# Patient Record
Sex: MALE | Race: ASIAN | NOT HISPANIC OR LATINO | URBAN - METROPOLITAN AREA
[De-identification: names, ages, dates, MRNs, and addresses within clinical notes are randomized per-mention and may not be internally consistent; named-entity substitution may affect disease eponyms.]

---

## 2017-01-05 ENCOUNTER — EMERGENCY (EMERGENCY)
Facility: HOSPITAL | Age: 57
LOS: 1 days | Discharge: HOME | End: 2017-01-05
Attending: EMERGENCY MEDICINE | Admitting: EMERGENCY MEDICINE
Payer: MEDICAID

## 2017-01-05 VITALS
SYSTOLIC BLOOD PRESSURE: 146 MMHG | OXYGEN SATURATION: 100 % | HEART RATE: 102 BPM | RESPIRATION RATE: 19 BRPM | DIASTOLIC BLOOD PRESSURE: 82 MMHG | TEMPERATURE: 98 F

## 2017-01-05 DIAGNOSIS — J34.2 DEVIATED NASAL SEPTUM: Chronic | ICD-10-CM

## 2017-01-05 PROCEDURE — 99281 EMR DPT VST MAYX REQ PHY/QHP: CPT

## 2017-01-05 RX ORDER — DULOXETINE HYDROCHLORIDE 30 MG/1
2 CAPSULE, DELAYED RELEASE ORAL
Qty: 14 | Refills: 0 | OUTPATIENT
Start: 2017-01-05 | End: 2017-01-12

## 2017-01-05 RX ORDER — DULOXETINE HYDROCHLORIDE 30 MG/1
1 CAPSULE, DELAYED RELEASE ORAL
Qty: 14 | Refills: 0 | COMMUNITY
Start: 2017-01-05 | End: 2017-01-12

## 2017-01-05 RX ORDER — ZOLPIDEM TARTRATE 10 MG/1
1 TABLET ORAL
Qty: 7 | Refills: 0 | OUTPATIENT
Start: 2017-01-05

## 2017-01-05 NOTE — ED PROVIDER NOTE - MEDICAL DECISION MAKING DETAILS
Pt here for med refill. Will refill Simvalta and Rx ambient for x1wk duration. Return for any SI or other Sx. Pt here for med refill. Will refill Cymbalta and Rx ambient for x1wk duration. Return for any SI or other Sx. Pt here for med refill. Will refill Cymbalta and Rx ambient for x1wk duration  until psych appt. Return for any SI or other Sx. no need for ED w/u.

## 2017-01-05 NOTE — ED PROVIDER NOTE - CONTEXT
known (describe)/PMHx insomnia/Depression, ran out of Mina and Lavinia known (describe)/PMHx insomnia/Depression, ran out of Cymbalta and Imovan

## 2017-01-05 NOTE — ED PROVIDER NOTE - OBJECTIVE STATEMENT
55yo M with PMHx of depression (on Simvalta 120mg), Insomnia (on Imovan 22.5mg x3 qhs), presents to ED c/o insomnia, "cannot stop thinking", requesting medication refill s/p running out of Simvalt and Imovan today. Pt also c/o waking up in sweat "all the time". Pt states he has been taking Simvalta and Imovan for x10yrs, Imovan has been ineffective for x2yrs but used to work with only x1 tablet. Imovan last taken yesterday night. He has tried taking Stilnox for insomnia without relief. He was seen at an ED in [place], [place] x9dago for SI, Rx'd Fluphenazine 5mg BID, Benztropine 2mg qd, Trazodone 243nyq3 qhs. Pt is at ED because he could not get a psych appointment until x1wk from today. Pt state [place] the [place] from [place]d ago, he is currently financially bankrupt. Denies current SI, any other complaints. 57yo M with PMHx of depression (on Cymbalta 120mg), Insomnia (on Imovane 22.5mg x3 qhs), presents to ED c/o insomnia, "cannot stop thinking", requesting medication refill s/p running out of Cymbalta and Imovanetoday. Pt also c/o waking up in sweat "all the time". Pt states he has been taking Cymbalta and Imovane for x10yrs, Imovane has been ineffective for x2yrs but used to work with only x1 tablet. Imovane last taken yesterday night. He has tried taking Stilnox for insomnia without relief. He was seen at an ED in [place], [place] x9dago for SI, Rx'd Fluphenazine 5mg BID, Benztropine 2mg qd, Trazodone [id number] qhs. Pt is at ED because he could not get a psych appointment until x1wk from today. Pt state [place] from [place]d ago, he is currently financially bankrupt. Denies current SI, any other complaints. 57yo M with PMHx of depression (on Cymbalta 120mg hs), Insomnia (on Imovane 22.5mg x3 qhs), presents to ED c/o insomnia, "cannot stop thinking", requesting medication refill s/p running out of Cymbalta and Imovane today. Pt states he has been taking Cymbalta and Imovane for x10yrs, Imovane has been ineffective for x2yrs but used to work with only x1 tablet. Imovane last taken yesterday night. He has tried taking Stilnox for insomnia without relief. He was seen at an ED in [place], [place] x9dago for SI, Rx'd Fluphenazine 5mg BID, Benztropine 2mg qd, Trazodone 134zyi0 qhs. Pt is at ED because he could not get a psych appointment until x1wk from today. Pt state [place] from [place] ago, he is currently financially bankrupt. Denies current SI, any other complaints. says psych meds from [facility] ED did not work and only gave him a dry mouth.

## 2017-01-05 NOTE — ED ADULT TRIAGE NOTE - CHIEF COMPLAINT QUOTE
Pt states he is from [facility] and visiting family in [place]. Hx of insomnia and depression. Pt does not have any meds with him and would like a prescription for them. On cymbalta 120mg and requesting a new med for insomnia. Psych attending called, requesting for patient to be seen by otf [facility].

## 2017-01-05 NOTE — ED PROVIDER NOTE - NS ED MD SCRIBE ATTENDING SCRIBE SECTIONS
HIV/REVIEW OF SYSTEMS/PHYSICAL EXAM/HISTORY OF PRESENT ILLNESS/VITAL SIGNS( Pullset)/DISPOSITION/PAST MEDICAL/SURGICAL/SOCIAL HISTORY

## 2017-01-17 PROBLEM — Z00.00 ENCOUNTER FOR PREVENTIVE HEALTH EXAMINATION: Status: ACTIVE | Noted: 2017-01-17

## 2017-02-01 PROBLEM — F32.9 MAJOR DEPRESSIVE DISORDER, SINGLE EPISODE, UNSPECIFIED: Chronic | Status: ACTIVE | Noted: 2017-01-05

## 2017-02-01 PROBLEM — G47.00 INSOMNIA, UNSPECIFIED: Chronic | Status: ACTIVE | Noted: 2017-01-05

## 2017-02-03 ENCOUNTER — APPOINTMENT (OUTPATIENT)
Dept: UROLOGY | Facility: CLINIC | Age: 57
End: 2017-02-03

## 2017-02-24 ENCOUNTER — APPOINTMENT (OUTPATIENT)
Dept: UROLOGY | Facility: CLINIC | Age: 57
End: 2017-02-24

## 2017-02-24 VITALS
HEIGHT: 71 IN | SYSTOLIC BLOOD PRESSURE: 111 MMHG | HEART RATE: 98 BPM | WEIGHT: 185 LBS | TEMPERATURE: 98.3 F | DIASTOLIC BLOOD PRESSURE: 68 MMHG | BODY MASS INDEX: 25.9 KG/M2

## 2017-02-24 DIAGNOSIS — F32.9 MAJOR DEPRESSIVE DISORDER, SINGLE EPISODE, UNSPECIFIED: ICD-10-CM

## 2017-02-24 DIAGNOSIS — Z83.3 FAMILY HISTORY OF DIABETES MELLITUS: ICD-10-CM

## 2017-02-24 RX ORDER — DULOXETINE HYDROCHLORIDE 30 MG/1
CAPSULE, DELAYED RELEASE ORAL
Refills: 0 | Status: ACTIVE | COMMUNITY

## 2017-02-27 ENCOUNTER — APPOINTMENT (OUTPATIENT)
Age: 57
End: 2017-02-27

## 2017-02-27 LAB
BILIRUB UR QL STRIP: NORMAL
CLARITY UR: CLEAR
COLLECTION METHOD: NORMAL
GLUCOSE UR-MCNC: NORMAL
HCG UR QL: 32 EU/DL
HGB UR QL STRIP.AUTO: NORMAL
KETONES UR-MCNC: NORMAL
LEUKOCYTE ESTERASE UR QL STRIP: NORMAL
NITRITE UR QL STRIP: NORMAL
PH UR STRIP: 6
PROT UR STRIP-MCNC: 100
SP GR UR STRIP: 1.02

## 2017-03-17 ENCOUNTER — APPOINTMENT (OUTPATIENT)
Dept: UROLOGY | Facility: CLINIC | Age: 57
End: 2017-03-17

## 2017-03-17 VITALS — HEART RATE: 111 BPM

## 2017-03-17 VITALS
WEIGHT: 185 LBS | DIASTOLIC BLOOD PRESSURE: 73 MMHG | BODY MASS INDEX: 25.9 KG/M2 | TEMPERATURE: 97.6 F | HEART RATE: 118 BPM | RESPIRATION RATE: 17 BRPM | SYSTOLIC BLOOD PRESSURE: 169 MMHG | HEIGHT: 71 IN

## 2017-03-17 LAB
PSA FREE FLD-MCNC: 31.7 %
PSA FREE SERPL-MCNC: 0.06 NG/ML
PSA SERPL-MCNC: 0.19 NG/ML

## 2017-03-17 RX ORDER — MIRABEGRON 25 MG/1
25 TABLET, FILM COATED, EXTENDED RELEASE ORAL
Qty: 30 | Refills: 0 | Status: ACTIVE | COMMUNITY
Start: 2017-03-17 | End: 1900-01-01

## 2017-04-05 ENCOUNTER — APPOINTMENT (OUTPATIENT)
Dept: CT IMAGING | Facility: IMAGING CENTER | Age: 57
End: 2017-04-05

## 2017-04-05 ENCOUNTER — INPATIENT (INPATIENT)
Facility: HOSPITAL | Age: 57
LOS: 6 days | Discharge: HOME | End: 2017-04-12
Attending: HOSPITALIST | Admitting: HOSPITALIST
Payer: MEDICAID

## 2017-04-05 ENCOUNTER — OUTPATIENT (OUTPATIENT)
Dept: OUTPATIENT SERVICES | Facility: HOSPITAL | Age: 57
LOS: 1 days | End: 2017-04-05
Payer: MEDICAID

## 2017-04-05 VITALS
RESPIRATION RATE: 18 BRPM | SYSTOLIC BLOOD PRESSURE: 124 MMHG | TEMPERATURE: 97 F | OXYGEN SATURATION: 97 % | HEART RATE: 93 BPM | DIASTOLIC BLOOD PRESSURE: 74 MMHG

## 2017-04-05 DIAGNOSIS — R59.0 LOCALIZED ENLARGED LYMPH NODES: ICD-10-CM

## 2017-04-05 DIAGNOSIS — J34.2 DEVIATED NASAL SEPTUM: Chronic | ICD-10-CM

## 2017-04-05 LAB
ALBUMIN SERPL ELPH-MCNC: 3.4 G/DL — SIGNIFICANT CHANGE UP (ref 3.3–5)
ALP SERPL-CCNC: 157 U/L — HIGH (ref 40–120)
ALT FLD-CCNC: 19 U/L — SIGNIFICANT CHANGE UP (ref 4–41)
ANISOCYTOSIS BLD QL: SLIGHT — SIGNIFICANT CHANGE UP
APTT BLD: 43.9 SEC — HIGH (ref 27.5–37.4)
AST SERPL-CCNC: 22 U/L — SIGNIFICANT CHANGE UP (ref 4–40)
BASOPHILS # BLD AUTO: 0.04 K/UL — SIGNIFICANT CHANGE UP (ref 0–0.2)
BASOPHILS NFR BLD AUTO: 0.7 % — SIGNIFICANT CHANGE UP (ref 0–2)
BILIRUB SERPL-MCNC: 0.6 MG/DL — SIGNIFICANT CHANGE UP (ref 0.2–1.2)
BUN SERPL-MCNC: 15 MG/DL — SIGNIFICANT CHANGE UP (ref 7–23)
CALCIUM SERPL-MCNC: 9.4 MG/DL — SIGNIFICANT CHANGE UP (ref 8.4–10.5)
CHLORIDE SERPL-SCNC: 96 MMOL/L — LOW (ref 98–107)
CO2 SERPL-SCNC: 22 MMOL/L — SIGNIFICANT CHANGE UP (ref 22–31)
CREAT SERPL-MCNC: 0.99 MG/DL — SIGNIFICANT CHANGE UP (ref 0.5–1.3)
EOSINOPHIL # BLD AUTO: 0.09 K/UL — SIGNIFICANT CHANGE UP (ref 0–0.5)
EOSINOPHIL NFR BLD AUTO: 1.5 % — SIGNIFICANT CHANGE UP (ref 0–6)
GLUCOSE SERPL-MCNC: 96 MG/DL — SIGNIFICANT CHANGE UP (ref 70–99)
HCT VFR BLD CALC: 31.2 % — LOW (ref 39–50)
HGB BLD-MCNC: 9.7 G/DL — LOW (ref 13–17)
IMM GRANULOCYTES NFR BLD AUTO: 0.3 % — SIGNIFICANT CHANGE UP (ref 0–1.5)
INR BLD: 1.28 — HIGH (ref 0.88–1.17)
LYMPHOCYTES # BLD AUTO: 1.23 K/UL — SIGNIFICANT CHANGE UP (ref 1–3.3)
LYMPHOCYTES # BLD AUTO: 21.1 % — SIGNIFICANT CHANGE UP (ref 13–44)
MANUAL SMEAR VERIFICATION: SIGNIFICANT CHANGE UP
MCHC RBC-ENTMCNC: 21.3 PG — LOW (ref 27–34)
MCHC RBC-ENTMCNC: 31.1 % — LOW (ref 32–36)
MCV RBC AUTO: 68.4 FL — LOW (ref 80–100)
MICROCYTES BLD QL: SLIGHT — SIGNIFICANT CHANGE UP
MONOCYTES # BLD AUTO: 0.74 K/UL — SIGNIFICANT CHANGE UP (ref 0–0.9)
MONOCYTES NFR BLD AUTO: 12.7 % — SIGNIFICANT CHANGE UP (ref 2–14)
NEUTROPHILS # BLD AUTO: 3.71 K/UL — SIGNIFICANT CHANGE UP (ref 1.8–7.4)
NEUTROPHILS NFR BLD AUTO: 63.7 % — SIGNIFICANT CHANGE UP (ref 43–77)
OB PNL STL: POSITIVE — SIGNIFICANT CHANGE UP
PLATELET # BLD AUTO: 439 K/UL — HIGH (ref 150–400)
PLATELET COUNT - ESTIMATE: NORMAL — SIGNIFICANT CHANGE UP
PMV BLD: 9.3 FL — SIGNIFICANT CHANGE UP (ref 7–13)
POTASSIUM SERPL-MCNC: 4.5 MMOL/L — SIGNIFICANT CHANGE UP (ref 3.5–5.3)
POTASSIUM SERPL-SCNC: 4.5 MMOL/L — SIGNIFICANT CHANGE UP (ref 3.5–5.3)
PROT SERPL-MCNC: 10.3 G/DL — HIGH (ref 6–8.3)
PROTHROM AB SERPL-ACNC: 14.4 SEC — HIGH (ref 9.8–13.1)
RBC # BLD: 4.56 M/UL — SIGNIFICANT CHANGE UP (ref 4.2–5.8)
RBC # FLD: 19 % — HIGH (ref 10.3–14.5)
SODIUM SERPL-SCNC: 133 MMOL/L — LOW (ref 135–145)
TARGETS BLD QL SMEAR: SLIGHT — SIGNIFICANT CHANGE UP
TSH SERPL-MCNC: 2.82 UIU/ML — SIGNIFICANT CHANGE UP (ref 0.27–4.2)
WBC # BLD: 5.83 K/UL — SIGNIFICANT CHANGE UP (ref 3.8–10.5)
WBC # FLD AUTO: 5.83 K/UL — SIGNIFICANT CHANGE UP (ref 3.8–10.5)

## 2017-04-05 PROCEDURE — 70553 MRI BRAIN STEM W/O & W/DYE: CPT | Mod: 26

## 2017-04-05 PROCEDURE — 70491 CT SOFT TISSUE NECK W/DYE: CPT | Mod: 26

## 2017-04-05 PROCEDURE — 70450 CT HEAD/BRAIN W/O DYE: CPT | Mod: 26

## 2017-04-05 RX ORDER — KETOROLAC TROMETHAMINE 30 MG/ML
15 SYRINGE (ML) INJECTION ONCE
Qty: 0 | Refills: 0 | Status: DISCONTINUED | OUTPATIENT
Start: 2017-04-05 | End: 2017-04-05

## 2017-04-05 RX ADMIN — Medication 15 MILLIGRAM(S): at 23:45

## 2017-04-05 RX ADMIN — Medication 15 MILLIGRAM(S): at 23:27

## 2017-04-05 NOTE — ED ADULT NURSE NOTE - ED STAT RN HANDOFF DETAILS
Patient is in NAD, and has room available.  Report given to nurse on floor via phone.  Patient awaiting transportation.  Will continue to monitor patient closely. IRASEMA Hyman R.N.

## 2017-04-05 NOTE — ED PROVIDER NOTE - ATTENDING CONTRIBUTION TO CARE
Canas: 57 yom with sent to ED for abnormal brain finding on Ct neck.  Pt has hx of depression and fatigue and was getting workup and found to have abnormal thyroid and lymph nodes of neck, has set up for biopsy next week.  Pt states he has frontal headache, no change in vision, no trauma, no issues with balance.  On exam, well appearing flat affect, no SI or HI, clear lungs, right neck LAD, clear lungs, normal cardiac, abd soft, no edema, no calf tn.  CT head non con ordered, unable to give IV today.

## 2017-04-05 NOTE — ED ADULT NURSE NOTE - OBJECTIVE STATEMENT
pt to trauma B, alert and oriented X 3 from [address] [place] [address] after having CT of neck for lumbar radiculopathy and CT result revealed a possible mass on the brain or bleed.  Pt denies any pain yet c/o dizziness and feeling tired, labs drawn and sent, vitals as noted, will monitor and follow up with additional orders as necessary

## 2017-04-05 NOTE — ED ADULT NURSE NOTE - CHIEF COMPLAINT QUOTE
Pt sent from [address] [place] [address] after having CT of neck for lumbar radiculopathy and CT result revealed a possible mass on the brain or bleed.  Pt denies any pain yet c/o dizziness and feeling tired  22g to right wrist placed by radiology dept

## 2017-04-05 NOTE — ED ADULT TRIAGE NOTE - CHIEF COMPLAINT QUOTE
Pt sent from [address] [place] [address] after having CT of neck for lumbar radiculopathy and CT result revealed a possible mass on the brain or bleed.  Pt denies any pain or discomfort at this time   22g to right wrist placed by radiology dept Pt sent from [address] [place] [address] after having CT of neck for lumbar radiculopathy and CT result revealed a possible mass on the brain or bleed.  Pt denies any pain yet c/o dizziness and feeling tired  22g to right wrist placed by radiology dept

## 2017-04-05 NOTE — ED PROVIDER NOTE - PROGRESS NOTE DETAILS
Pt has been reassessed at regular intervals during ED stay. Pt A&Ox3, normal motor/sensation. Awaiting MRI read. Spoke with radiology, who say they are getting to it soon. ISMAEL Ratliff: Pt signed out to me. Mass on CT. Discussed with NSGY. They want patient admitted to medicine for inpatient workup. Discussed with pt and agrees with the plan. ISMAEL Ratliff: Discussed with nsgy, hospitalist. Plan to admit to med, nsgy following, oncology c/s and further workup per their plan.

## 2017-04-05 NOTE — ED PROVIDER NOTE - OBJECTIVE STATEMENT
56 y/o male [facility] depression sent in by radiology for abnormal neck CT. Pt admits to living in [place] and [place] the [place] 2 [place]. Pt admits to seeing a psychiatrist to refil his depression meds but she wanted routine blood first. Pt was found to be anemia and have abnormal TSH. Pt was sent to PMD and who found large lymph nodes in R neck. Pt was sent for CT today and now found to have meningioma vs bleed on CT and was sent to ER for eval. Pt admits to generalized fatigue x1 year which he attributed to depression. Pt denies chest pain, sob, n/v/d, difficulty swallowing, weight loss, numbness, tingling, weakness, dizziness, syncope, change in vision, fever or chills. Pt also c/o BRBPR x2 days, mixed with stool, painless. 56 y/o male [facility] depression sent in by radiology for abnormal neck CT. Pt admits to living in [place]. Pt admits to seeing a psychiatrist to refill his depression meds but she wanted routine blood first. Pt was found to be anemic and have abnormal TSH. Pt was sent to PMD and who found large lymph nodes in R neck. Pt was sent for CT today and now found to have meningioma vs bleed on CT and was sent to ER for eval. Pt admits to generalized fatigue x1 year which he attributed to depression. Pt denies chest pain, sob, n/v/d, difficulty swallowing, weight loss, numbness, tingling, weakness, dizziness, syncope, change in vision, fever or chills. Pt also c/o BRBPR x2 days, mixed with stool, painless.

## 2017-04-06 DIAGNOSIS — F32.9 MAJOR DEPRESSIVE DISORDER, SINGLE EPISODE, UNSPECIFIED: ICD-10-CM

## 2017-04-06 DIAGNOSIS — Z41.8 ENCOUNTER FOR OTHER PROCEDURES FOR PURPOSES OTHER THAN REMEDYING HEALTH STATE: ICD-10-CM

## 2017-04-06 DIAGNOSIS — G93.9 DISORDER OF BRAIN, UNSPECIFIED: ICD-10-CM

## 2017-04-06 DIAGNOSIS — D50.9 IRON DEFICIENCY ANEMIA, UNSPECIFIED: ICD-10-CM

## 2017-04-06 DIAGNOSIS — K62.5 HEMORRHAGE OF ANUS AND RECTUM: ICD-10-CM

## 2017-04-06 LAB
ALBUMIN SERPL ELPH-MCNC: 3.3 G/DL — SIGNIFICANT CHANGE UP (ref 3.3–5)
ALP SERPL-CCNC: 150 U/L — HIGH (ref 40–120)
ALT FLD-CCNC: 13 U/L — SIGNIFICANT CHANGE UP (ref 4–41)
AST SERPL-CCNC: 14 U/L — SIGNIFICANT CHANGE UP (ref 4–40)
BILIRUB SERPL-MCNC: 0.6 MG/DL — SIGNIFICANT CHANGE UP (ref 0.2–1.2)
BLD GP AB SCN SERPL QL: NEGATIVE — SIGNIFICANT CHANGE UP
BUN SERPL-MCNC: 21 MG/DL — SIGNIFICANT CHANGE UP (ref 7–23)
CALCIUM SERPL-MCNC: 9.5 MG/DL — SIGNIFICANT CHANGE UP (ref 8.4–10.5)
CHLORIDE SERPL-SCNC: 98 MMOL/L — SIGNIFICANT CHANGE UP (ref 98–107)
CO2 SERPL-SCNC: 22 MMOL/L — SIGNIFICANT CHANGE UP (ref 22–31)
CREAT SERPL-MCNC: 1.01 MG/DL — SIGNIFICANT CHANGE UP (ref 0.5–1.3)
FERRITIN SERPL-MCNC: 97.56 NG/ML — SIGNIFICANT CHANGE UP (ref 30–400)
GLUCOSE SERPL-MCNC: 119 MG/DL — HIGH (ref 70–99)
HCT VFR BLD CALC: 29.5 % — LOW (ref 39–50)
HGB BLD-MCNC: 9.2 G/DL — LOW (ref 13–17)
IRON SATN MFR SERPL: 204 UG/DL — SIGNIFICANT CHANGE UP (ref 155–535)
IRON SATN MFR SERPL: 27 UG/DL — LOW (ref 45–165)
MCHC RBC-ENTMCNC: 20.9 PG — LOW (ref 27–34)
MCHC RBC-ENTMCNC: 31.2 % — LOW (ref 32–36)
MCV RBC AUTO: 67 FL — LOW (ref 80–100)
PLATELET # BLD AUTO: 429 K/UL — HIGH (ref 150–400)
PMV BLD: 8.8 FL — SIGNIFICANT CHANGE UP (ref 7–13)
POTASSIUM SERPL-MCNC: 4.4 MMOL/L — SIGNIFICANT CHANGE UP (ref 3.5–5.3)
POTASSIUM SERPL-SCNC: 4.4 MMOL/L — SIGNIFICANT CHANGE UP (ref 3.5–5.3)
PROT SERPL-MCNC: 9.8 G/DL — HIGH (ref 6–8.3)
PROT SERPL-MCNC: 9.8 G/DL — HIGH (ref 6–8.3)
PROT UR-MCNC: 89 MG/DL — SIGNIFICANT CHANGE UP
RBC # BLD: 4.4 M/UL — SIGNIFICANT CHANGE UP (ref 4.2–5.8)
RBC # FLD: 18.5 % — HIGH (ref 10.3–14.5)
RETICS #: 49.3 10X3/UL — SIGNIFICANT CHANGE UP (ref 17–73)
RETICS/RBC NFR: 1.2 % — SIGNIFICANT CHANGE UP (ref 0.5–2.5)
RH IG SCN BLD-IMP: POSITIVE — SIGNIFICANT CHANGE UP
SODIUM SERPL-SCNC: 137 MMOL/L — SIGNIFICANT CHANGE UP (ref 135–145)
UIBC SERPL-MCNC: 177 UG/DL — SIGNIFICANT CHANGE UP (ref 110–370)
WBC # BLD: 4.56 K/UL — SIGNIFICANT CHANGE UP (ref 3.8–10.5)
WBC # FLD AUTO: 4.56 K/UL — SIGNIFICANT CHANGE UP (ref 3.8–10.5)

## 2017-04-06 PROCEDURE — 86334 IMMUNOFIX E-PHORESIS SERUM: CPT | Mod: 26

## 2017-04-06 PROCEDURE — 84165 PROTEIN E-PHORESIS SERUM: CPT | Mod: 26

## 2017-04-06 PROCEDURE — 71260 CT THORAX DX C+: CPT | Mod: 26

## 2017-04-06 PROCEDURE — 84166 PROTEIN E-PHORESIS/URINE/CSF: CPT | Mod: 26

## 2017-04-06 PROCEDURE — 74177 CT ABD & PELVIS W/CONTRAST: CPT | Mod: 26

## 2017-04-06 PROCEDURE — 99233 SBSQ HOSP IP/OBS HIGH 50: CPT | Mod: GC

## 2017-04-06 RX ORDER — ALPRAZOLAM 0.25 MG
1 TABLET ORAL DAILY
Qty: 0 | Refills: 0 | Status: DISCONTINUED | OUTPATIENT
Start: 2017-04-06 | End: 2017-04-07

## 2017-04-06 RX ORDER — INFLUENZA VIRUS VACCINE 15; 15; 15; 15 UG/.5ML; UG/.5ML; UG/.5ML; UG/.5ML
0.5 SUSPENSION INTRAMUSCULAR ONCE
Qty: 0 | Refills: 0 | Status: DISCONTINUED | OUTPATIENT
Start: 2017-04-06 | End: 2017-04-12

## 2017-04-06 RX ORDER — LANOLIN ALCOHOL/MO/W.PET/CERES
3 CREAM (GRAM) TOPICAL AT BEDTIME
Qty: 0 | Refills: 0 | Status: DISCONTINUED | OUTPATIENT
Start: 2017-04-06 | End: 2017-04-07

## 2017-04-06 RX ADMIN — Medication 3 MILLIGRAM(S): at 22:18

## 2017-04-06 RX ADMIN — Medication 1 MILLIGRAM(S): at 18:11

## 2017-04-06 NOTE — H&P ADULT. - PMH
Anxiety    Benign non-nodular prostatic hyperplasia with lower urinary tract symptoms    Depression    Insomnia

## 2017-04-06 NOTE — H&P ADULT. - RS GEN PE MLT RESP DETAILS PC
clear to auscultation bilaterally/no intercostal retractions/good air movement/no rales/no rhonchi/airway patent/no chest wall tenderness/no subcutaneous emphysema/breath sounds equal/no wheezes/respirations non-labored

## 2017-04-06 NOTE — H&P ADULT. - LAB RESULTS AND INTERPRETATION
WBC 5.83  Hb 9.7 MCV 68.4 Plt 439  FOBT+  INR 1.28  Tprotein 10.3 Albumin 3.5  Bili 0.6  Alk Phos 157  AST 22 ALT 19

## 2017-04-06 NOTE — H&P ADULT. - NEGATIVE CARDIOVASCULAR SYMPTOMS
no peripheral edema/no palpitations/no paroxysmal nocturnal dyspnea/no dyspnea on exertion/no chest pain/no claudication

## 2017-04-06 NOTE — H&P ADULT. - MUSCULOSKELETAL
details… detailed exam no calf tenderness/normal strength/no joint warmth/no joint swelling/no joint erythema/ROM intact

## 2017-04-06 NOTE — H&P ADULT. - NEGATIVE ENMT SYMPTOMS
no hearing difficulty/no sinus symptoms/no tinnitus/no nasal discharge/no throat pain/no ear pain/no gum bleeding/no vertigo/no dry mouth/no nasal obstruction/no nose bleeds/no nasal congestion/no dysphagia

## 2017-04-06 NOTE — H&P ADULT. - NEGATIVE NEUROLOGICAL SYMPTOMS
no vertigo/no tremors/no difficulty walking/no syncope/no paresthesias/no confusion/no weakness/no facial palsy/no focal seizures/no loss of sensation/no loss of consciousness/no hemiparesis/no generalized seizures

## 2017-04-06 NOTE — H&P ADULT. - NEGATIVE GASTROINTESTINAL SYMPTOMS
no diarrhea/no abdominal pain/no flatulence/no steatorrhea/no jaundice/no constipation/no nausea/no vomiting/no melena

## 2017-04-06 NOTE — H&P ADULT. - ASSESSMENT
57M PMH BPH, major depressive disorder, p/w bifrontal headache, lymphadenopathy, weight loss, found to have dural mass with lymphadenopathy concerning for malignancy

## 2017-04-06 NOTE — H&P ADULT. - PROBLEM SELECTOR PLAN 1
Concerning for metastatic process given LAD, GI symptoms  - check CT Chest, abdomen and pelvis   - will require biopsy will determine best site once ct complete  - check SPEP, UPEP, immunofixation given protein gap and anemia.   - neurosurgery consulted- no acute intervention. Holding steroids pending biopsy  - pain control as needed  - trend neuro checks

## 2017-04-06 NOTE — ED ADULT NURSE REASSESSMENT NOTE - NS ED NURSE REASSESS COMMENT FT1
Report received from previous shift WAGNER Mendoza. Pt A&Ox4, in NAD. Pt awaiting MRI results. Will continue to monitor closely.

## 2017-04-06 NOTE — H&P ADULT. - ATTENDING COMMENTS
Patient seen and examined with house staff.  Agree with above as edited.  Patient with depression and BPH found to have right sided frontal parietal mass with mass effect and neck lymphadenopathy.  Appreciate Neurosurg eval.  Check CT c/a/p and decide where to biopsy.  Check CRISPIN, SPEP, UPEP, HIV.  ONC eval.  Microcytic anemia - check Fe studies.  Patient refusing rectal exam.  Trend h/h.

## 2017-04-06 NOTE — H&P ADULT. - NEGATIVE OPHTHALMOLOGIC SYMPTOMS
no diplopia/no loss of vision L/no blurred vision L/no photophobia/no loss of vision R/no blurred vision R

## 2017-04-06 NOTE — H&P ADULT. - NEGATIVE PSYCHIATRIC SYMPTOMS
no agitation/no visual hallucinations/no suicidal ideation/no memory loss/no auditory hallucinations/no hyperactivity

## 2017-04-06 NOTE — H&P ADULT. - HISTORY OF PRESENT ILLNESS
57M PMH BPH, major depressive disorder, presenting from radiology facility for imaging abnormalities.    Pt reports that he is visiting from [place] 57M PMH BPH, major depressive disorder, presenting from radiology facility for imaging abnormalities.    Pt reports that he is visiting from [facility] and over the past few months he has felt increasing fatigue and malaise which he attributed to his depression.  He went to a psychiatrist to obtain a refill of cymabalta (home depression medication)  The psychiatrist recommended that the patient see a PMD for routine work up prior to refilling the prescription. PMD did lab work which showed anemia and an abnormal TSH. At that visit the pt also endorses chronic headaches and neck pain associated with right sided neck swelling. The patient was instructed to get a CT-neck and see a neck surgeon for further evaluation of lymphadenopathy. It was noted on the CT neck that the patient had 57M PMH BPH, major depressive disorder, presenting from radiology facility for imaging abnormalities.  Pt reports that he is visiting from [facility] and over the past few months he has felt increasing fatigue and malaise which he attributed to his depression.  He went to a psychiatrist to obtain a refill of cymabalta (home depression medication)  The psychiatrist recommended that the patient see a PMD for routine work up prior to refilling the prescription. PMD did lab work which showed anemia and an abnormal TSH. At that visit the pt also endorses chronic headaches and neck pain associated with right sided neck swelling. The patient was instructed to get a CT-neck and see a neck surgeon for further evaluation of lymphadenopathy.   It was noted on the CT neck that the patient had "Hyperdense soft tissue mass along the right parietal convexity which is incompletely seen on the current exam. While this may represent a meningioma, the possibility of an extra-axial intracranial hemorrhage is not entirely excluded and pre and postcontrast MR of the brain is advised for further evaluation." 57M PMH BPH, major depressive disorder, presenting from radiology facility for imaging abnormalities.  Pt reports that he is visiting from [facility] and over the past few months he has felt increasing fatigue and malaise which he attributed to his depression.  He went to a psychiatrist to obtain a refill of cymabalta (home depression medication)  The psychiatrist recommended that the patient see a PMD for routine work up prior to refilling the prescription. PMD did lab work which showed anemia and an abnormal TSH. At that visit the pt also endorses chronic headaches and neck pain associated with right sided neck swelling. The patient was instructed to get a CT-neck and see a neck surgeon for further evaluation of lymphadenopathy.   It was noted on the CT neck that the patient had "Hyperdense soft tissue mass along the right parietal convexity which is incompletely seen on the current exam. While this may represent a meningioma, the possibility of an extra-axial intracranial hemorrhage is not entirely excluded and pre and postcontrast MR of the brain is advised for further evaluation." Pt was instructed to go to the ED for further evaluation.  Pt endorses three to four months of progressive right sided neck swelling with tender, swollen lymph nodes. During this time course pt also notes bifrontal headache without vision changes, hearing loss, nausea, vomiting, weakness, paresthesias dizziness, or balance problems. Pt endorses 15lb weight loss with decreased appetite.   No chest pain, cough, fevers, chills, abdominal pain, nausea, vomiting, diarrhea, constipation. Pt does endorses 48 hrs of bright red blood per rectum. Denies melena. Also states he occasionally has SOB before going to sleep, but denies any BONE.  Pt states he has been hospitalized for severe depression with suicidal ideation in the past; denies SI now. Also reports that he is actively being hacked, which [place] officials are investigating. He states that he told his home psychiatrist this and was told "he is crazy"  In the ED pt afebrile HR 93 /74 RR 18 [id number]% RA   s/p toradol x1

## 2017-04-06 NOTE — H&P ADULT. - LYMPHATIC
supraclavicular R/anterior cervical L/posterior cervical R/anterior cervical R/posterior cervical L/supraclavicular L

## 2017-04-06 NOTE — H&P ADULT. - PROBLEM SELECTOR PLAN 3
Concerning for malignancy vs hemorrhoid. Pt refusing rectal exam. FOBT+  - check CBC q12  - stool count

## 2017-04-07 ENCOUNTER — TRANSCRIPTION ENCOUNTER (OUTPATIENT)
Age: 57
End: 2017-04-07

## 2017-04-07 LAB
ALBUMIN SERPL ELPH-MCNC: 3.2 G/DL — LOW (ref 3.3–5)
ALP SERPL-CCNC: 149 U/L — HIGH (ref 40–120)
ALT FLD-CCNC: 14 U/L — SIGNIFICANT CHANGE UP (ref 4–41)
AST SERPL-CCNC: 15 U/L — SIGNIFICANT CHANGE UP (ref 4–40)
BASOPHILS # BLD AUTO: 0.04 K/UL — SIGNIFICANT CHANGE UP (ref 0–0.2)
BASOPHILS NFR BLD AUTO: 0.8 % — SIGNIFICANT CHANGE UP (ref 0–2)
BASOPHILS NFR SPEC: 2 % — SIGNIFICANT CHANGE UP (ref 0–2)
BILIRUB SERPL-MCNC: 0.7 MG/DL — SIGNIFICANT CHANGE UP (ref 0.2–1.2)
BUN SERPL-MCNC: 13 MG/DL — SIGNIFICANT CHANGE UP (ref 7–23)
CALCIUM SERPL-MCNC: 9.3 MG/DL — SIGNIFICANT CHANGE UP (ref 8.4–10.5)
CHLORIDE SERPL-SCNC: 97 MMOL/L — LOW (ref 98–107)
CO2 SERPL-SCNC: 23 MMOL/L — SIGNIFICANT CHANGE UP (ref 22–31)
CREAT SERPL-MCNC: 0.91 MG/DL — SIGNIFICANT CHANGE UP (ref 0.5–1.3)
EOSINOPHIL # BLD AUTO: 0.1 K/UL — SIGNIFICANT CHANGE UP (ref 0–0.5)
EOSINOPHIL NFR BLD AUTO: 1.9 % — SIGNIFICANT CHANGE UP (ref 0–6)
EOSINOPHIL NFR FLD: 1 % — SIGNIFICANT CHANGE UP (ref 0–6)
GLUCOSE SERPL-MCNC: 126 MG/DL — HIGH (ref 70–99)
HCT VFR BLD CALC: 30.7 % — LOW (ref 39–50)
HCV AB S/CO SERPL IA: 0.14 S/CO — SIGNIFICANT CHANGE UP
HCV AB SERPL-IMP: SIGNIFICANT CHANGE UP
HGB BLD-MCNC: 9.5 G/DL — LOW (ref 13–17)
HIV1 AG SER QL: SIGNIFICANT CHANGE UP
HIV1+2 AB SPEC QL: SIGNIFICANT CHANGE UP
HYPOCHROMIA BLD QL: SLIGHT — SIGNIFICANT CHANGE UP
IMM GRANULOCYTES NFR BLD AUTO: 0.2 % — SIGNIFICANT CHANGE UP (ref 0–1.5)
LYMPHOCYTES # BLD AUTO: 1.36 K/UL — SIGNIFICANT CHANGE UP (ref 1–3.3)
LYMPHOCYTES # BLD AUTO: 26.3 % — SIGNIFICANT CHANGE UP (ref 13–44)
LYMPHOCYTES NFR SPEC AUTO: 19 % — SIGNIFICANT CHANGE UP (ref 13–44)
MAGNESIUM SERPL-MCNC: 1.8 MG/DL — SIGNIFICANT CHANGE UP (ref 1.6–2.6)
MANUAL SMEAR VERIFICATION: SIGNIFICANT CHANGE UP
MCHC RBC-ENTMCNC: 20.8 PG — LOW (ref 27–34)
MCHC RBC-ENTMCNC: 30.9 % — LOW (ref 32–36)
MCV RBC AUTO: 67.2 FL — LOW (ref 80–100)
MICROCYTES BLD QL: SIGNIFICANT CHANGE UP
MONOCYTES # BLD AUTO: 0.94 K/UL — HIGH (ref 0–0.9)
MONOCYTES NFR BLD AUTO: 18.1 % — HIGH (ref 2–14)
MONOCYTES NFR BLD: 13 % — HIGH (ref 2–9)
NEUTROPHIL AB SER-ACNC: 65 % — SIGNIFICANT CHANGE UP (ref 43–77)
NEUTROPHILS # BLD AUTO: 2.73 K/UL — SIGNIFICANT CHANGE UP (ref 1.8–7.4)
NEUTROPHILS NFR BLD AUTO: 52.7 % — SIGNIFICANT CHANGE UP (ref 43–77)
PHOSPHATE SERPL-MCNC: 3.6 MG/DL — SIGNIFICANT CHANGE UP (ref 2.5–4.5)
PLATELET # BLD AUTO: 429 K/UL — HIGH (ref 150–400)
PLATELET COUNT - ESTIMATE: NORMAL — SIGNIFICANT CHANGE UP
PMV BLD: 8.9 FL — SIGNIFICANT CHANGE UP (ref 7–13)
POTASSIUM SERPL-MCNC: 4 MMOL/L — SIGNIFICANT CHANGE UP (ref 3.5–5.3)
POTASSIUM SERPL-SCNC: 4 MMOL/L — SIGNIFICANT CHANGE UP (ref 3.5–5.3)
PROT SERPL-MCNC: 9.9 G/DL — HIGH (ref 6–8.3)
RBC # BLD: 4.57 M/UL — SIGNIFICANT CHANGE UP (ref 4.2–5.8)
RBC # FLD: 18.5 % — HIGH (ref 10.3–14.5)
SODIUM SERPL-SCNC: 135 MMOL/L — SIGNIFICANT CHANGE UP (ref 135–145)
WBC # BLD: 5.18 K/UL — SIGNIFICANT CHANGE UP (ref 3.8–10.5)
WBC # FLD AUTO: 5.18 K/UL — SIGNIFICANT CHANGE UP (ref 3.8–10.5)

## 2017-04-07 PROCEDURE — 99233 SBSQ HOSP IP/OBS HIGH 50: CPT | Mod: GC

## 2017-04-07 RX ORDER — HALOPERIDOL DECANOATE 100 MG/ML
5 INJECTION INTRAMUSCULAR EVERY 6 HOURS
Qty: 0 | Refills: 0 | Status: DISCONTINUED | OUTPATIENT
Start: 2017-04-07 | End: 2017-04-10

## 2017-04-07 RX ORDER — ALPRAZOLAM 0.25 MG
1 TABLET ORAL EVERY 12 HOURS
Qty: 0 | Refills: 0 | Status: DISCONTINUED | OUTPATIENT
Start: 2017-04-07 | End: 2017-04-12

## 2017-04-07 RX ORDER — QUETIAPINE FUMARATE 200 MG/1
50 TABLET, FILM COATED ORAL EVERY 6 HOURS
Qty: 0 | Refills: 0 | Status: DISCONTINUED | OUTPATIENT
Start: 2017-04-07 | End: 2017-04-10

## 2017-04-07 RX ORDER — QUETIAPINE FUMARATE 200 MG/1
100 TABLET, FILM COATED ORAL AT BEDTIME
Qty: 0 | Refills: 0 | Status: DISCONTINUED | OUTPATIENT
Start: 2017-04-07 | End: 2017-04-10

## 2017-04-07 RX ORDER — SODIUM CHLORIDE 0.65 %
1 AEROSOL, SPRAY (ML) NASAL
Qty: 0 | Refills: 0 | Status: DISCONTINUED | OUTPATIENT
Start: 2017-04-07 | End: 2017-04-12

## 2017-04-07 RX ADMIN — HALOPERIDOL DECANOATE 5 MILLIGRAM(S): 100 INJECTION INTRAMUSCULAR at 18:37

## 2017-04-07 RX ADMIN — Medication 1 MILLIGRAM(S): at 15:10

## 2017-04-07 NOTE — DISCHARGE NOTE ADULT - PLAN OF CARE
Follow up with hematology You were admitted to the hospital because of headache and right neck pain and were found on imaging to have diffusely enlarged lymph nodes. The hematology team was consulted and given concerns for a possible underlying blood process causing the enlargement, recommended an excision of a lymph node so that a definitive diagnosis can be made by pathology. The surgical oncology team performed an excision and you were monitored for a few hours to ensure there was no neurologic impairment resulting from where the lymph node was removed. It is very important that you follow up with [facility] at [phone] in 5-7 days to review the results of your biopsy and for further management pending the result. Please also follow up with Dr. Lovell in surgical oncology within 1-2 weeks for a post-procedure check of your wound. His office number is in this paperwork. You have been sent a prescription for percocet as needed for pain for one week. You were found on imaging to have a mass outside of the cranial bone, the cause of which is not yet identified. It is possible that the mass is related to the same process causing your enlarged lymph nodes, though until you follow up with hematology to review the results of the lymph node biopsy it is difficult to say at this time. The neurosurgery team saw you and recommended that the lymph node results be known prior to making a decision whether they would like to do a biopsy of the brain mass, also. Regardless, the contact information for the neurosurgeon, Dr. Mcdaniel, is in this paperwork if you would like to make an appointment for further evaluation prior to recommendations from the hematology team. The psychiatry team saw you during your stay to help manage your symptoms of depression and anxiety. In addition to continuing your antidepressant, they recommended trazodone each night to help with sleep. A prescription for this has been sent to the pharmacy so please take this as prescribed. Please continue taking Xanax as you were for anxiety only as needed, up to twice daily.

## 2017-04-07 NOTE — DISCHARGE NOTE ADULT - PATIENT PORTAL LINK FT
“You can access the [facility] Patient Portal, offered by [facility], by registering with the following website: [email or web address]”

## 2017-04-07 NOTE — DISCHARGE NOTE ADULT - PROVIDER TOKENS
TOKEN:'[id number]:'[id number]',FREE:[LAST:[[facility]],PHONE:[phone]],FAX:[(   )    -]]

## 2017-04-07 NOTE — DISCHARGE NOTE ADULT - CARE PROVIDER_API CALL
Gabriel Lovell), Surgery; Surgical Oncology  [address] [place] [id number]  Phone: [phone]  Fax: [phone]    Emiliano Mcdaniel), Neurological Surgery  [address] [place] [address]  Phone: [phone]  Fax: [phone]    [facility],   Phone: [phone]  Fax: (   )    -

## 2017-04-07 NOTE — DISCHARGE NOTE ADULT - CARE PLAN
Principal Discharge DX:	Lymphadenopathy  Goal:	Follow up with hematology  Instructions for follow-up, activity and diet:	You were admitted to the hospital because of headache and right neck pain and were found on imaging to have diffusely enlarged lymph nodes. The hematology team was consulted and given concerns for a possible underlying blood process causing the enlargement, recommended an excision of a lymph node so that a definitive diagnosis can be made by pathology. The surgical oncology team performed an excision and you were monitored for a few hours to ensure there was no neurologic impairment resulting from where the lymph node was removed. It is very important that you follow up with [facility] at [phone] in 5-7 days to review the results of your biopsy and for further management pending the result. Please also follow up with Dr. Lovell in surgical oncology within 1-2 weeks for a post-procedure check of your wound. His office number is in this paperwork. You have been sent a prescription for percocet as needed for pain for one week.  Secondary Diagnosis:	Brain mass  Instructions for follow-up, activity and diet:	You were found on imaging to have a mass outside of the cranial bone, the cause of which is not yet identified. It is possible that the mass is related to the same process causing your enlarged lymph nodes, though until you follow up with hematology to review the results of the lymph node biopsy it is difficult to say at this time. The neurosurgery team saw you and recommended that the lymph node results be known prior to making a decision whether they would like to do a biopsy of the brain mass, also. Regardless, the contact information for the neurosurgeon, Dr. Mcdaniel, is in this paperwork if you would like to make an appointment for further evaluation prior to recommendations from the hematology team.  Secondary Diagnosis:	Moderate episode of recurrent major depressive disorder  Instructions for follow-up, activity and diet:	The psychiatry team saw you during your stay to help manage your symptoms of depression and anxiety. In addition to continuing your antidepressant, they recommended trazodone each night to help with sleep. A prescription for this has been sent to the pharmacy so please take this as prescribed.  Secondary Diagnosis:	Anxiety  Instructions for follow-up, activity and diet:	Please continue taking Xanax as you were for anxiety only as needed, up to twice daily. Principal Discharge DX:	Lymphadenopathy  Goal:	Follow up with hematology  Instructions for follow-up, activity and diet:	You were admitted to the hospital because of headache and right neck pain and were found on imaging to have diffusely enlarged lymph nodes. The hematology team was consulted and given concerns for a possible underlying blood process causing the enlargement, recommended an excision of a lymph node so that a definitive diagnosis can be made by pathology. The surgical oncology team performed an excision and you were monitored for a few hours to ensure there was no neurologic impairment resulting from where the lymph node was removed. It is very important that you follow up with [facility] at [phone] in 5-7 days to review the results of your biopsy and for further management pending the result. Please also follow up with Dr. Lovell in surgical oncology within 1-2 weeks for a post-procedure check of your wound. His office number is in this paperwork. You have been sent a prescription for percocet as needed for pain for one week.  Secondary Diagnosis:	Brain mass  Instructions for follow-up, activity and diet:	You were found on imaging to have a mass outside of the cranial bone, the cause of which is not yet identified. It is possible that the mass is related to the same process causing your enlarged lymph nodes, though until you follow up with hematology to review the results of the lymph node biopsy it is difficult to say at this time. The neurosurgery team saw you and recommended that the lymph node results be known prior to making a decision whether they would like to do a biopsy of the brain mass, also. Regardless, the contact information for the neurosurgeon, Dr. Mcdaniel, is in this paperwork if you would like to make an appointment for further evaluation prior to recommendations from the hematology team.  Secondary Diagnosis:	Moderate episode of recurrent major depressive disorder  Instructions for follow-up, activity and diet:	The psychiatry team saw you during your stay to help manage your symptoms of depression and anxiety. In addition to continuing your antidepressant, they recommended trazodone each night to help with sleep. A prescription for this has been sent to the pharmacy so please take this as prescribed.  Secondary Diagnosis:	Anxiety  Instructions for follow-up, activity and diet:	Please continue taking Xanax as you were for anxiety only as needed, up to twice daily. Principal Discharge DX:	Lymphadenopathy  Goal:	Follow up with hematology  Instructions for follow-up, activity and diet:	You were admitted to the hospital because of headache and right neck pain and were found on imaging to have diffusely enlarged lymph nodes. The hematology team was consulted and given concerns for a possible underlying blood process causing the enlargement, recommended an excision of a lymph node so that a definitive diagnosis can be made by pathology. The surgical oncology team performed an excision and you were monitored for a few hours to ensure there was no neurologic impairment resulting from where the lymph node was removed. It is very important that you follow up with [facility] at [phone] in 5-7 days to review the results of your biopsy and for further management pending the result. Please also follow up with Dr. Lovell in surgical oncology within 1-2 weeks for a post-procedure check of your wound. His office number is in this paperwork. You have been sent a prescription for percocet as needed for pain for one week.  Secondary Diagnosis:	Brain mass  Instructions for follow-up, activity and diet:	You were found on imaging to have a mass outside of the cranial bone, the cause of which is not yet identified. It is possible that the mass is related to the same process causing your enlarged lymph nodes, though until you follow up with hematology to review the results of the lymph node biopsy it is difficult to say at this time. The neurosurgery team saw you and recommended that the lymph node results be known prior to making a decision whether they would like to do a biopsy of the brain mass, also. Regardless, the contact information for the neurosurgeon, Dr. Mcdaniel, is in this paperwork if you would like to make an appointment for further evaluation prior to recommendations from the hematology team.  Secondary Diagnosis:	Moderate episode of recurrent major depressive disorder  Instructions for follow-up, activity and diet:	The psychiatry team saw you during your stay to help manage your symptoms of depression and anxiety. In addition to continuing your antidepressant, they recommended trazodone each night to help with sleep. A prescription for this has been sent to the pharmacy so please take this as prescribed.  Secondary Diagnosis:	Anxiety  Instructions for follow-up, activity and diet:	Please continue taking Xanax as you were for anxiety only as needed, up to twice daily. Principal Discharge DX:	Lymphadenopathy  Goal:	Follow up with hematology  Instructions for follow-up, activity and diet:	You were admitted to the hospital because of headache and right neck pain and were found on imaging to have diffusely enlarged lymph nodes. The hematology team was consulted and given concerns for a possible underlying blood process causing the enlargement, recommended an excision of a lymph node so that a definitive diagnosis can be made by pathology. The surgical oncology team performed an excision and you were monitored for a few hours to ensure there was no neurologic impairment resulting from where the lymph node was removed. It is very important that you follow up with [facility] at [phone] in 5-7 days to review the results of your biopsy and for further management pending the result. Please also follow up with Dr. Lovell in surgical oncology within 1-2 weeks for a post-procedure check of your wound. His office number is in this paperwork. You have been sent a prescription for percocet as needed for pain for one week.  Secondary Diagnosis:	Brain mass  Instructions for follow-up, activity and diet:	You were found on imaging to have a mass outside of the cranial bone, the cause of which is not yet identified. It is possible that the mass is related to the same process causing your enlarged lymph nodes, though until you follow up with hematology to review the results of the lymph node biopsy it is difficult to say at this time. The neurosurgery team saw you and recommended that the lymph node results be known prior to making a decision whether they would like to do a biopsy of the brain mass, also. Regardless, the contact information for the neurosurgeon, Dr. Mcdaniel, is in this paperwork if you would like to make an appointment for further evaluation prior to recommendations from the hematology team.  Secondary Diagnosis:	Moderate episode of recurrent major depressive disorder  Instructions for follow-up, activity and diet:	The psychiatry team saw you during your stay to help manage your symptoms of depression and anxiety. In addition to continuing your antidepressant, they recommended trazodone each night to help with sleep. A prescription for this has been sent to the pharmacy so please take this as prescribed.  Secondary Diagnosis:	Anxiety  Instructions for follow-up, activity and diet:	Please continue taking Xanax as you were for anxiety only as needed, up to twice daily. Principal Discharge DX:	Lymphadenopathy  Goal:	Follow up with hematology  Instructions for follow-up, activity and diet:	You were admitted to the hospital because of headache and right neck pain and were found on imaging to have diffusely enlarged lymph nodes. The hematology team was consulted and given concerns for a possible underlying blood process causing the enlargement, recommended an excision of a lymph node so that a definitive diagnosis can be made by pathology. The surgical oncology team performed an excision and you were monitored for a few hours to ensure there was no neurologic impairment resulting from where the lymph node was removed. It is very important that you follow up with [facility] at [phone] in 5-7 days to review the results of your biopsy and for further management pending the result. Please also follow up with Dr. Lovell in surgical oncology within 1-2 weeks for a post-procedure check of your wound. His office number is in this paperwork. You have been sent a prescription for percocet as needed for pain for one week.  Secondary Diagnosis:	Brain mass  Instructions for follow-up, activity and diet:	You were found on imaging to have a mass outside of the cranial bone, the cause of which is not yet identified. It is possible that the mass is related to the same process causing your enlarged lymph nodes, though until you follow up with hematology to review the results of the lymph node biopsy it is difficult to say at this time. The neurosurgery team saw you and recommended that the lymph node results be known prior to making a decision whether they would like to do a biopsy of the brain mass, also. Regardless, the contact information for the neurosurgeon, Dr. Mcdaniel, is in this paperwork if you would like to make an appointment for further evaluation prior to recommendations from the hematology team.  Secondary Diagnosis:	Moderate episode of recurrent major depressive disorder  Instructions for follow-up, activity and diet:	The psychiatry team saw you during your stay to help manage your symptoms of depression and anxiety. In addition to continuing your antidepressant, they recommended trazodone each night to help with sleep. A prescription for this has been sent to the pharmacy so please take this as prescribed.  Secondary Diagnosis:	Anxiety  Instructions for follow-up, activity and diet:	Please continue taking Xanax as you were for anxiety only as needed, up to twice daily.

## 2017-04-07 NOTE — DISCHARGE NOTE ADULT - CARE PROVIDERS DIRECT ADDRESSES
,[email or web address],DirectAddress_Unknown,DirectAddress_Unknown

## 2017-04-07 NOTE — DISCHARGE NOTE ADULT - HOSPITAL COURSE
57M PMH BPH, major depressive disorder, presenting from radiology facility for imaging abnormalities.  Pt reports that he is visiting from [facility] and over the past few months he has felt increasing fatigue and malaise which he attributed to his depression.  He went to a psychiatrist to obtain a refill of cymabalta (home depression medication)  The psychiatrist recommended that the patient see a PMD for routine work up prior to refilling the prescription. PMD did lab work which showed anemia and an abnormal TSH. At that visit the pt also endorses chronic headaches and neck pain associated with right sided neck swelling. The patient was instructed to get a CT-neck and see a neck surgeon for further evaluation of lymphadenopathy.   It was noted on the CT neck that the patient had "Hyperdense soft tissue mass along the right parietal convexity which is incompletely seen on the current exam. While this may represent a meningioma, the possibility of an extra-axial intracranial hemorrhage is not entirely excluded and pre and postcontrast MR of the brain is advised for further evaluation." Pt was instructed to go to the ED for further evaluation.  Pt endorses three to four months of progressive right sided neck swelling with tender, swollen lymph nodes. During this time course pt also notes bifrontal headache without vision changes, hearing loss, nausea, vomiting, weakness, paresthesias dizziness, or balance problems. Pt endorses 15lb weight loss with decreased appetite.   No chest pain, cough, fevers, chills, abdominal pain, nausea, vomiting, diarrhea, constipation. Pt does endorses 48 hrs of bright red blood per rectum. Denies melena. Also states he occasionally has SOB before going to sleep, but denies any BONE.  Pt states he has been hospitalized for severe depression with suicidal ideation in the past; denies SI now. Also reports that he is actively being hacked, which [place] officials are investigating. He states that he told his home psychiatrist this and was told "he is crazy"    In the ED pt afebrile HR 93 /74 RR 18 HsF333% RA   CT head was notable for: Homogeneous appearing hyperdense mass along the high right frontoparietal convexity with associated mass effect and edema within the underlying brain. Mottled lucency is seen within the adjacent calvarium with adjacent extracalvarial soft tissue. Findings favor a transosseous neoplasm and meningioma is a prime consideration however metastatic disease or lymphoma are not excluded.   An MRI brain showed: Enhancing extra-axial dural based mass in the right parietal region measures 6.2 x 2.6 x 2.4 cm and demonstrates prominent dural tail. Mild mass effect and edema in the adjacent right frontoparietal convexity.  Enhancement of the adjacent right parietal extracalvarial scalp lesion suggesting additional tumor due to transcalvarial spread.  Neurosurgery was consulted and did not recommend acute intervention. Per neurosurgery it is advisable to biopsy an available node rather than scalp/intracranial mass  A CT Chest/Abd/Pelvis showed: Lymphadenopathy involving the bilateral axilla, retroperitoneum, and bilateral inguinal regions is suggestive of lymphoma. No splenomegaly.  Pt underwent IR guided biopsy which showed ______.  Psychiatry was consulted because the patient expressed thoughts of self-harm. He was placed on a one-to-one. They recommended seroquel nightly and PRN medications.   Pt was d/c home with instructions to follow up with hematology within one week. HPI:  57M PMH BPH, major depressive disorder, presenting from radiology facility for imaging abnormalities.  Pt reports that he is visiting from [facility] and over the past few months he has felt increasing fatigue and malaise which he attributed to his depression.  He went to a psychiatrist to obtain a refill of cymabalta (home depression medication)  The psychiatrist recommended that the patient see a PMD for routine work up prior to refilling the prescription. PMD did lab work which showed anemia and an abnormal TSH. At that visit the pt also endorses chronic headaches and neck pain associated with right sided neck swelling. The patient was instructed to get a CT-neck and see a neck surgeon for further evaluation of lymphadenopathy.   It was noted on the CT neck that the patient had "Hyperdense soft tissue mass along the right parietal convexity which is incompletely seen on the current exam. While this may represent a meningioma, the possibility of an extra-axial intracranial hemorrhage is not entirely excluded and pre and postcontrast MR of the brain is advised for further evaluation." Pt was instructed to go to the ED for further evaluation.  Pt endorses three to four months of progressive right sided neck swelling with tender, swollen lymph nodes. During this time course pt also notes bifrontal headache without vision changes, hearing loss, nausea, vomiting, weakness, paresthesias dizziness, or balance problems. Pt endorses 15lb weight loss with decreased appetite. No chest pain, cough, fevers, chills, abdominal pain, nausea, vomiting, diarrhea, constipation. Pt does endorses 48 hrs of bright red blood per rectum. Denies melena. Also states he occasionally has SOB before going to sleep, but denies any BONE. Pt states he has been hospitalized for severe depression with suicidal ideation in the past; denies SI now. Also reports that he is actively being hacked, which [place] officials are investigating. He states that he told his home psychiatrist this and was told "he is crazy"    In the ED pt afebrile HR 93 /74 RR 18 [id number]% RA   CT head was notable for: Homogeneous appearing hyperdense mass along the high right frontoparietal convexity with associated mass effect and edema within the underlying brain. Mottled lucency is seen within the adjacent calvarium with adjacent extracalvarial soft tissue. Findings favor a transosseous neoplasm and meningioma is a prime consideration however metastatic disease or lymphoma are not excluded. An MRI brain showed: Enhancing extra-axial dural based mass in the right parietal region measures 6.2 x 2.6 x 2.4 cm and demonstrates prominent dural tail. Mild mass effect and edema in the adjacent right frontoparietal convexity.  Enhancement of the adjacent right parietal extracalvarial scalp lesion suggesting additional tumor due to transcalvarial spread. A CT Chest/Abd/Pelvis showed: Lymphadenopathy involving the bilateral axilla, retroperitoneum, and bilateral inguinal regions is suggestive of lymphoma. No splenomegaly.  Hospital Course:  On admission for workup of lymphadenopathy and cranial mass the neurosurgery team was consulted and did not recommend acute intervention. Per neurosurgery, it was considered advisable to biopsy an available node rather than scalp/intracranial mass as the mass was very likely to be related to the same hematologic process causing lymphadenopathy. The hematology service was then consulted and believed patient may have a low grade lymphoma given his normal white blood cell count and profound tissue lymphadenopathy. They recommended a complete excisional biopsy and surgical oncology was consulted, who performed an excision of a palpable left lymph node in the neck without complication. With clearance from both hematology and surgical oncology, following appropriate neurologic check post-procedure, the patient was discharged stable and in good condition on 4/12/17. He was advised to follow up with [facility] in 5-7 days to review the pathology from the lymph node biopsy, which would then dictate need for further treatment. Evaluation by neurosurgery for the brain mass will be predicated on the results of the lymph node biopsy and guided by hematology.  In regards to patient's underlying diagnoses of both anxiety and major depression, the psychiatry team was consulted early on in hospital course because the patient expressed thoughts of self-harm. He was placed on a one-to-one observation, which was eventually discontinued as patient no longer denied suicidal ideation. In addition, the team recommended seroquel nightly and PRN Haldol for agitation, though owing to concerns for prolongation of QTc interval Haldol and Seroquel were eventually discontinued and he was started on trazodone nightly instead. Patient was continued throughout course on his home medications of duloxetine and PRN Xanax. HPI:  57M PMH BPH, major depressive disorder, presenting from radiology facility for imaging abnormalities.  Pt reports that he is visiting from [facility] and over the past few months he has felt increasing fatigue and malaise which he attributed to his depression.  He went to a psychiatrist to obtain a refill of Cymbalta (home depression medication)  The psychiatrist recommended that the patient see a PMD for routine work up prior to refilling the prescription. PMD did lab work which showed anemia and an abnormal TSH. At that visit the pt also endorses chronic headaches and neck pain associated with right sided neck swelling. The patient was instructed to get a CT-neck and see a neck surgeon for further evaluation of lymphadenopathy.     It was noted on the CT neck that the patient had "Hyperdense soft tissue mass along the right parietal convexity which is incompletely seen on the current exam. While this may represent a meningioma, the possibility of an extra-axial intracranial hemorrhage is not entirely excluded and pre and postcontrast MR of the brain is advised for further evaluation." Pt was instructed to go to the ED for further evaluation.    Pt endorses three to four months of progressive right sided neck swelling with tender, swollen lymph nodes. During this time course pt also notes bifrontal headache without vision changes, hearing loss, nausea, vomiting, weakness, paresthesias dizziness, or balance problems. Pt endorses 15lb weight loss with decreased appetite. No chest pain, cough, fevers, chills, abdominal pain, nausea, vomiting, diarrhea, constipation. Pt does endorses 48 hrs of bright red blood per rectum. Denies melena. Also states he occasionally has SOB before going to sleep, but denies any BONE. Pt states he has been hospitalized for severe depression with suicidal ideation in the past; denies SI now. Also reports that he is actively being hacked, which [place] officials are investigating. He states that he told his home psychiatrist this and was told "he is crazy"    In the ED pt afebrile HR 93 /74 RR 18 AjG215% RA   CT head was notable for: Homogeneous appearing hyperdense mass along the high right frontoparietal convexity with associated mass effect and edema within the underlying brain. Mottled lucency is seen within the adjacent calvarium with adjacent extracalvarial soft tissue. Findings favor a transosseous neoplasm and meningioma is a prime consideration however metastatic disease or lymphoma are not excluded. An MRI brain showed: Enhancing extra-axial dural based mass in the right parietal region measures 6.2 x 2.6 x 2.4 cm and demonstrates prominent dural tail. Mild mass effect and edema in the adjacent right frontoparietal convexity.  Enhancement of the adjacent right parietal extracalvarial scalp lesion suggesting additional tumor due to transcalvarial spread. A CT Chest/Abd/Pelvis showed: Lymphadenopathy involving the bilateral axilla, retroperitoneum, and bilateral inguinal regions is suggestive of lymphoma. No splenomegaly.  Hospital Course:  On admission for workup of lymphadenopathy and cranial mass the neurosurgery team was consulted and did not recommend acute intervention. Per neurosurgery, it was considered advisable to biopsy an available node rather than scalp/intracranial mass as the mass was very likely to be related to the same hematologic process causing lymphadenopathy. The hematology service was then consulted and believed patient may have a low grade lymphoma given his normal white blood cell count and profound tissue lymphadenopathy. They recommended a complete excisional biopsy and surgical oncology was consulted, who performed an excision of a palpable left lymph node in the neck without complication. With clearance from both hematology and surgical oncology, following appropriate neurologic check post-procedure, the patient was discharged stable and in good condition on 4/12/17. He was advised to follow up with [facility] in 5-7 days to review the pathology from the lymph node biopsy, which would then dictate need for further treatment. Evaluation by neurosurgery for the brain mass will be predicated on the results of the lymph node biopsy and guided by hematology.  In regards to patient's underlying diagnoses of both anxiety and major depression, the psychiatry team was consulted early on in hospital course because the patient expressed thoughts of self-harm. He was placed on a one-to-one observation, which was eventually discontinued as patient no longer denied suicidal ideation. In addition, the team recommended seroquel nightly and PRN Haldol for agitation, though owing to concerns for prolongation of QTc interval Haldol and Seroquel were eventually discontinued and he was started on trazodone nightly instead. Patient was continued throughout course on his home medications of duloxetine and PRN Xanax.

## 2017-04-07 NOTE — DISCHARGE NOTE ADULT - MEDICATION SUMMARY - MEDICATIONS TO CHANGE
I will SWITCH the dose or number of times a day I take the medications listed below when I get home from the hospital:  None I will SWITCH the dose or number of times a day I take the medications listed below when I get home from the hospital:    Xanax 2 mg oral tablet  -- 1 tab(s) by mouth once a day

## 2017-04-07 NOTE — DISCHARGE NOTE ADULT - MEDICATION SUMMARY - MEDICATIONS TO TAKE
I will START or STAY ON the medications listed below when I get home from the hospital:    acetaminophen-oxyCODONE 325 mg-5 mg oral tablet  -- 1 tab(s) by mouth every 6 hours, As needed, Moderate Pain (4 - 6) MDD:MDD 4 tabs  -- Indication: For Pain control    DULoxetine 60 mg oral delayed release capsule  -- 1 cap(s) by mouth once a day  -- Check with your doctor before becoming pregnant.  Obtain medical advice before taking any non-prescription drugs as some may affect the action of this medication.  Swallow whole.  Do not crush.  This drug may impair the ability to drive or operate machinery.  Use care until you become familiar with its effects.    -- Indication: For Major depression    traZODone 100 mg oral tablet  -- 1 tab(s) by mouth once a day (at bedtime) MDD:MDD 1 tablet per day  -- Indication: For Major depression     Xanax 2 mg oral tablet  -- 1 tab(s) by mouth 2 times a day as needed for anxiety  -- Indication: For Anxiety I will START or STAY ON the medications listed below when I get home from the hospital:    acetaminophen-oxyCODONE 325 mg-5 mg oral tablet  -- 1 tab(s) by mouth every 6 hours, As needed, Moderate Pain (4 - 6) MDD:MDD 4 tablets per day  -- Indication: For Pain control    DULoxetine 60 mg oral delayed release capsule  -- 1 cap(s) by mouth once a day  -- Check with your doctor before becoming pregnant.  Obtain medical advice before taking any non-prescription drugs as some may affect the action of this medication.  Swallow whole.  Do not crush.  This drug may impair the ability to drive or operate machinery.  Use care until you become familiar with its effects.    -- Indication: For Major depression    traZODone 100 mg oral tablet  -- 1 tab(s) by mouth once a day (at bedtime)  -- Indication: For Major depression    ALPRAZolam 1 mg oral tablet  -- 1 tab(s) by mouth every 12 hours, As needed, Anxiety MDD:MDD two tablets daily  -- Indication: For Anxiety

## 2017-04-08 LAB
BASOPHILS # BLD AUTO: 0.04 K/UL — SIGNIFICANT CHANGE UP (ref 0–0.2)
BASOPHILS NFR BLD AUTO: 0.8 % — SIGNIFICANT CHANGE UP (ref 0–2)
BUN SERPL-MCNC: 15 MG/DL — SIGNIFICANT CHANGE UP (ref 7–23)
CALCIUM SERPL-MCNC: 9.6 MG/DL — SIGNIFICANT CHANGE UP (ref 8.4–10.5)
CHLORIDE SERPL-SCNC: 96 MMOL/L — LOW (ref 98–107)
CO2 SERPL-SCNC: 21 MMOL/L — LOW (ref 22–31)
CREAT SERPL-MCNC: 0.96 MG/DL — SIGNIFICANT CHANGE UP (ref 0.5–1.3)
EOSINOPHIL # BLD AUTO: 0.09 K/UL — SIGNIFICANT CHANGE UP (ref 0–0.5)
EOSINOPHIL NFR BLD AUTO: 1.7 % — SIGNIFICANT CHANGE UP (ref 0–6)
GLUCOSE SERPL-MCNC: 116 MG/DL — HIGH (ref 70–99)
HCT VFR BLD CALC: 30.2 % — LOW (ref 39–50)
HGB BLD-MCNC: 9.5 G/DL — LOW (ref 13–17)
IMM GRANULOCYTES NFR BLD AUTO: 0.4 % — SIGNIFICANT CHANGE UP (ref 0–1.5)
LYMPHOCYTES # BLD AUTO: 1.58 K/UL — SIGNIFICANT CHANGE UP (ref 1–3.3)
LYMPHOCYTES # BLD AUTO: 30.3 % — SIGNIFICANT CHANGE UP (ref 13–44)
MAGNESIUM SERPL-MCNC: 2 MG/DL — SIGNIFICANT CHANGE UP (ref 1.6–2.6)
MCHC RBC-ENTMCNC: 21.2 PG — LOW (ref 27–34)
MCHC RBC-ENTMCNC: 31.5 % — LOW (ref 32–36)
MCV RBC AUTO: 67.4 FL — LOW (ref 80–100)
MONOCYTES # BLD AUTO: 0.88 K/UL — SIGNIFICANT CHANGE UP (ref 0–0.9)
MONOCYTES NFR BLD AUTO: 16.9 % — HIGH (ref 2–14)
NEUTROPHILS # BLD AUTO: 2.61 K/UL — SIGNIFICANT CHANGE UP (ref 1.8–7.4)
NEUTROPHILS NFR BLD AUTO: 49.9 % — SIGNIFICANT CHANGE UP (ref 43–77)
PHOSPHATE SERPL-MCNC: 4 MG/DL — SIGNIFICANT CHANGE UP (ref 2.5–4.5)
PLATELET # BLD AUTO: 459 K/UL — HIGH (ref 150–400)
PMV BLD: 8.9 FL — SIGNIFICANT CHANGE UP (ref 7–13)
POTASSIUM SERPL-MCNC: 4.5 MMOL/L — SIGNIFICANT CHANGE UP (ref 3.5–5.3)
POTASSIUM SERPL-SCNC: 4.5 MMOL/L — SIGNIFICANT CHANGE UP (ref 3.5–5.3)
RBC # BLD: 4.48 M/UL — SIGNIFICANT CHANGE UP (ref 4.2–5.8)
RBC # FLD: 18.7 % — HIGH (ref 10.3–14.5)
SODIUM SERPL-SCNC: 136 MMOL/L — SIGNIFICANT CHANGE UP (ref 135–145)
WBC # BLD: 5.22 K/UL — SIGNIFICANT CHANGE UP (ref 3.8–10.5)
WBC # FLD AUTO: 5.22 K/UL — SIGNIFICANT CHANGE UP (ref 3.8–10.5)

## 2017-04-08 PROCEDURE — 99497 ADVNCD CARE PLAN 30 MIN: CPT | Mod: 25,GC

## 2017-04-08 PROCEDURE — 99233 SBSQ HOSP IP/OBS HIGH 50: CPT | Mod: GC

## 2017-04-08 RX ADMIN — Medication 1 MILLIGRAM(S): at 23:06

## 2017-04-08 RX ADMIN — HALOPERIDOL DECANOATE 5 MILLIGRAM(S): 100 INJECTION INTRAMUSCULAR at 11:37

## 2017-04-08 RX ADMIN — HALOPERIDOL DECANOATE 5 MILLIGRAM(S): 100 INJECTION INTRAMUSCULAR at 22:01

## 2017-04-08 RX ADMIN — Medication 1 MILLIGRAM(S): at 11:37

## 2017-04-09 LAB
BASOPHILS # BLD AUTO: 0.04 K/UL — SIGNIFICANT CHANGE UP (ref 0–0.2)
BASOPHILS NFR BLD AUTO: 0.7 % — SIGNIFICANT CHANGE UP (ref 0–2)
BUN SERPL-MCNC: 17 MG/DL — SIGNIFICANT CHANGE UP (ref 7–23)
CALCIUM SERPL-MCNC: 9.4 MG/DL — SIGNIFICANT CHANGE UP (ref 8.4–10.5)
CHLORIDE SERPL-SCNC: 99 MMOL/L — SIGNIFICANT CHANGE UP (ref 98–107)
CO2 SERPL-SCNC: 21 MMOL/L — LOW (ref 22–31)
CREAT SERPL-MCNC: 0.98 MG/DL — SIGNIFICANT CHANGE UP (ref 0.5–1.3)
EOSINOPHIL # BLD AUTO: 0.12 K/UL — SIGNIFICANT CHANGE UP (ref 0–0.5)
EOSINOPHIL NFR BLD AUTO: 2.2 % — SIGNIFICANT CHANGE UP (ref 0–6)
GLUCOSE SERPL-MCNC: 118 MG/DL — HIGH (ref 70–99)
HCT VFR BLD CALC: 30.4 % — LOW (ref 39–50)
HGB BLD-MCNC: 9.6 G/DL — LOW (ref 13–17)
IMM GRANULOCYTES NFR BLD AUTO: 0.6 % — SIGNIFICANT CHANGE UP (ref 0–1.5)
LYMPHOCYTES # BLD AUTO: 1.48 K/UL — SIGNIFICANT CHANGE UP (ref 1–3.3)
LYMPHOCYTES # BLD AUTO: 27.4 % — SIGNIFICANT CHANGE UP (ref 13–44)
MAGNESIUM SERPL-MCNC: 1.8 MG/DL — SIGNIFICANT CHANGE UP (ref 1.6–2.6)
MANUAL SMEAR VERIFICATION: SIGNIFICANT CHANGE UP
MCHC RBC-ENTMCNC: 21.3 PG — LOW (ref 27–34)
MCHC RBC-ENTMCNC: 31.6 % — LOW (ref 32–36)
MCV RBC AUTO: 67.4 FL — LOW (ref 80–100)
MONOCYTES # BLD AUTO: 1.17 K/UL — HIGH (ref 0–0.9)
MONOCYTES NFR BLD AUTO: 21.6 % — HIGH (ref 2–14)
NEUTROPHILS # BLD AUTO: 2.57 K/UL — SIGNIFICANT CHANGE UP (ref 1.8–7.4)
NEUTROPHILS NFR BLD AUTO: 47.5 % — SIGNIFICANT CHANGE UP (ref 43–77)
PHOSPHATE SERPL-MCNC: 4 MG/DL — SIGNIFICANT CHANGE UP (ref 2.5–4.5)
PLATELET # BLD AUTO: 450 K/UL — HIGH (ref 150–400)
PMV BLD: 8.7 FL — SIGNIFICANT CHANGE UP (ref 7–13)
POTASSIUM SERPL-MCNC: 4.1 MMOL/L — SIGNIFICANT CHANGE UP (ref 3.5–5.3)
POTASSIUM SERPL-SCNC: 4.1 MMOL/L — SIGNIFICANT CHANGE UP (ref 3.5–5.3)
RBC # BLD: 4.51 M/UL — SIGNIFICANT CHANGE UP (ref 4.2–5.8)
RBC # FLD: 18.7 % — HIGH (ref 10.3–14.5)
SODIUM SERPL-SCNC: 135 MMOL/L — SIGNIFICANT CHANGE UP (ref 135–145)
WBC # BLD: 5.41 K/UL — SIGNIFICANT CHANGE UP (ref 3.8–10.5)
WBC # FLD AUTO: 5.41 K/UL — SIGNIFICANT CHANGE UP (ref 3.8–10.5)

## 2017-04-09 PROCEDURE — 99233 SBSQ HOSP IP/OBS HIGH 50: CPT | Mod: GC

## 2017-04-09 RX ADMIN — Medication 1 MILLIGRAM(S): at 11:09

## 2017-04-09 RX ADMIN — HALOPERIDOL DECANOATE 5 MILLIGRAM(S): 100 INJECTION INTRAMUSCULAR at 10:17

## 2017-04-09 RX ADMIN — HALOPERIDOL DECANOATE 5 MILLIGRAM(S): 100 INJECTION INTRAMUSCULAR at 21:23

## 2017-04-09 RX ADMIN — Medication 1 MILLIGRAM(S): at 23:11

## 2017-04-10 LAB
APTT BLD: 42.6 SEC — HIGH (ref 27.5–37.4)
BASOPHILS # BLD AUTO: 0.05 K/UL — SIGNIFICANT CHANGE UP (ref 0–0.2)
BASOPHILS NFR BLD AUTO: 1 % — SIGNIFICANT CHANGE UP (ref 0–2)
BUN SERPL-MCNC: 14 MG/DL — SIGNIFICANT CHANGE UP (ref 7–23)
CALCIUM SERPL-MCNC: 9.3 MG/DL — SIGNIFICANT CHANGE UP (ref 8.4–10.5)
CHLORIDE SERPL-SCNC: 96 MMOL/L — LOW (ref 98–107)
CO2 SERPL-SCNC: 23 MMOL/L — SIGNIFICANT CHANGE UP (ref 22–31)
CREAT SERPL-MCNC: 0.93 MG/DL — SIGNIFICANT CHANGE UP (ref 0.5–1.3)
EOSINOPHIL # BLD AUTO: 0.13 K/UL — SIGNIFICANT CHANGE UP (ref 0–0.5)
EOSINOPHIL NFR BLD AUTO: 2.5 % — SIGNIFICANT CHANGE UP (ref 0–6)
GLUCOSE SERPL-MCNC: 129 MG/DL — HIGH (ref 70–99)
HCT VFR BLD CALC: 30.3 % — LOW (ref 39–50)
HGB BLD-MCNC: 9.7 G/DL — LOW (ref 13–17)
IMM GRANULOCYTES NFR BLD AUTO: 0.4 % — SIGNIFICANT CHANGE UP (ref 0–1.5)
INR BLD: 1.26 — HIGH (ref 0.88–1.17)
LDH SERPL L TO P-CCNC: 83 U/L — LOW (ref 135–225)
LYMPHOCYTES # BLD AUTO: 1.52 K/UL — SIGNIFICANT CHANGE UP (ref 1–3.3)
LYMPHOCYTES # BLD AUTO: 29.6 % — SIGNIFICANT CHANGE UP (ref 13–44)
MAGNESIUM SERPL-MCNC: 1.9 MG/DL — SIGNIFICANT CHANGE UP (ref 1.6–2.6)
MCHC RBC-ENTMCNC: 21.5 PG — LOW (ref 27–34)
MCHC RBC-ENTMCNC: 32 % — SIGNIFICANT CHANGE UP (ref 32–36)
MCV RBC AUTO: 67 FL — LOW (ref 80–100)
MONOCYTES # BLD AUTO: 1.13 K/UL — HIGH (ref 0–0.9)
MONOCYTES NFR BLD AUTO: 22 % — HIGH (ref 2–14)
NEUTROPHILS # BLD AUTO: 2.28 K/UL — SIGNIFICANT CHANGE UP (ref 1.8–7.4)
NEUTROPHILS NFR BLD AUTO: 44.5 % — SIGNIFICANT CHANGE UP (ref 43–77)
PHOSPHATE SERPL-MCNC: 4.2 MG/DL — SIGNIFICANT CHANGE UP (ref 2.5–4.5)
PLATELET # BLD AUTO: 415 K/UL — HIGH (ref 150–400)
PMV BLD: 8.6 FL — SIGNIFICANT CHANGE UP (ref 7–13)
POTASSIUM SERPL-MCNC: 4.1 MMOL/L — SIGNIFICANT CHANGE UP (ref 3.5–5.3)
POTASSIUM SERPL-SCNC: 4.1 MMOL/L — SIGNIFICANT CHANGE UP (ref 3.5–5.3)
PROTHROM AB SERPL-ACNC: 14.2 SEC — HIGH (ref 9.8–13.1)
RBC # BLD: 4.52 M/UL — SIGNIFICANT CHANGE UP (ref 4.2–5.8)
RBC # FLD: 18.7 % — HIGH (ref 10.3–14.5)
SODIUM SERPL-SCNC: 136 MMOL/L — SIGNIFICANT CHANGE UP (ref 135–145)
WBC # BLD: 5.13 K/UL — SIGNIFICANT CHANGE UP (ref 3.8–10.5)
WBC # FLD AUTO: 5.13 K/UL — SIGNIFICANT CHANGE UP (ref 3.8–10.5)

## 2017-04-10 PROCEDURE — 99233 SBSQ HOSP IP/OBS HIGH 50: CPT | Mod: GC

## 2017-04-10 PROCEDURE — 99222 1ST HOSP IP/OBS MODERATE 55: CPT

## 2017-04-10 PROCEDURE — 99232 SBSQ HOSP IP/OBS MODERATE 35: CPT

## 2017-04-10 RX ORDER — HALOPERIDOL DECANOATE 100 MG/ML
2 INJECTION INTRAMUSCULAR EVERY 6 HOURS
Qty: 0 | Refills: 0 | Status: DISCONTINUED | OUTPATIENT
Start: 2017-04-10 | End: 2017-04-10

## 2017-04-10 RX ORDER — TRAZODONE HCL 50 MG
100 TABLET ORAL AT BEDTIME
Qty: 0 | Refills: 0 | Status: DISCONTINUED | OUTPATIENT
Start: 2017-04-10 | End: 2017-04-12

## 2017-04-10 RX ORDER — HALOPERIDOL DECANOATE 100 MG/ML
2 INJECTION INTRAMUSCULAR EVERY 6 HOURS
Qty: 0 | Refills: 0 | Status: DISCONTINUED | OUTPATIENT
Start: 2017-04-10 | End: 2017-04-12

## 2017-04-10 RX ADMIN — Medication 100 MILLIGRAM(S): at 22:42

## 2017-04-10 RX ADMIN — Medication 1 MILLIGRAM(S): at 14:00

## 2017-04-10 RX ADMIN — HALOPERIDOL DECANOATE 5 MILLIGRAM(S): 100 INJECTION INTRAMUSCULAR at 13:51

## 2017-04-11 ENCOUNTER — RESULT REVIEW (OUTPATIENT)
Age: 57
End: 2017-04-11

## 2017-04-11 LAB
BASOPHILS # BLD AUTO: 0.04 K/UL — SIGNIFICANT CHANGE UP (ref 0–0.2)
BASOPHILS NFR BLD AUTO: 0.9 % — SIGNIFICANT CHANGE UP (ref 0–2)
BUN SERPL-MCNC: 16 MG/DL — SIGNIFICANT CHANGE UP (ref 7–23)
CALCIUM SERPL-MCNC: 9 MG/DL — SIGNIFICANT CHANGE UP (ref 8.4–10.5)
CHLORIDE SERPL-SCNC: 99 MMOL/L — SIGNIFICANT CHANGE UP (ref 98–107)
CO2 SERPL-SCNC: 22 MMOL/L — SIGNIFICANT CHANGE UP (ref 22–31)
CREAT SERPL-MCNC: 1.06 MG/DL — SIGNIFICANT CHANGE UP (ref 0.5–1.3)
EOSINOPHIL # BLD AUTO: 0.08 K/UL — SIGNIFICANT CHANGE UP (ref 0–0.5)
EOSINOPHIL NFR BLD AUTO: 1.9 % — SIGNIFICANT CHANGE UP (ref 0–6)
GAS PNL BLDMV: SIGNIFICANT CHANGE UP
GLUCOSE SERPL-MCNC: 157 MG/DL — HIGH (ref 70–99)
HCT VFR BLD CALC: 30.1 % — LOW (ref 39–50)
HGB BLD-MCNC: 9.5 G/DL — LOW (ref 13–17)
IMM GRANULOCYTES NFR BLD AUTO: 0.2 % — SIGNIFICANT CHANGE UP (ref 0–1.5)
LDH SERPL L TO P-CCNC: 77 U/L — LOW (ref 135–225)
LYMPHOCYTES # BLD AUTO: 1.59 K/UL — SIGNIFICANT CHANGE UP (ref 1–3.3)
LYMPHOCYTES # BLD AUTO: 37.1 % — SIGNIFICANT CHANGE UP (ref 13–44)
MAGNESIUM SERPL-MCNC: 1.9 MG/DL — SIGNIFICANT CHANGE UP (ref 1.6–2.6)
MCHC RBC-ENTMCNC: 21.4 PG — LOW (ref 27–34)
MCHC RBC-ENTMCNC: 31.6 % — LOW (ref 32–36)
MCV RBC AUTO: 67.8 FL — LOW (ref 80–100)
MONOCYTES # BLD AUTO: 0.48 K/UL — SIGNIFICANT CHANGE UP (ref 0–0.9)
MONOCYTES NFR BLD AUTO: 11.2 % — SIGNIFICANT CHANGE UP (ref 2–14)
NEUTROPHILS # BLD AUTO: 2.09 K/UL — SIGNIFICANT CHANGE UP (ref 1.8–7.4)
NEUTROPHILS NFR BLD AUTO: 48.7 % — SIGNIFICANT CHANGE UP (ref 43–77)
PHOSPHATE SERPL-MCNC: 4.4 MG/DL — SIGNIFICANT CHANGE UP (ref 2.5–4.5)
PLATELET # BLD AUTO: 418 K/UL — HIGH (ref 150–400)
PMV BLD: 8.8 FL — SIGNIFICANT CHANGE UP (ref 7–13)
POTASSIUM SERPL-MCNC: 4.1 MMOL/L — SIGNIFICANT CHANGE UP (ref 3.5–5.3)
POTASSIUM SERPL-SCNC: 4.1 MMOL/L — SIGNIFICANT CHANGE UP (ref 3.5–5.3)
RBC # BLD: 4.44 M/UL — SIGNIFICANT CHANGE UP (ref 4.2–5.8)
RBC # FLD: 18.6 % — HIGH (ref 10.3–14.5)
SODIUM SERPL-SCNC: 137 MMOL/L — SIGNIFICANT CHANGE UP (ref 135–145)
URATE SERPL-MCNC: 7.5 MG/DL — SIGNIFICANT CHANGE UP (ref 3.4–8.8)
WBC # BLD: 4.29 K/UL — SIGNIFICANT CHANGE UP (ref 3.8–10.5)
WBC # FLD AUTO: 4.29 K/UL — SIGNIFICANT CHANGE UP (ref 3.8–10.5)

## 2017-04-11 PROCEDURE — 99233 SBSQ HOSP IP/OBS HIGH 50: CPT | Mod: GC

## 2017-04-11 PROCEDURE — 93010 ELECTROCARDIOGRAM REPORT: CPT

## 2017-04-11 PROCEDURE — 99222 1ST HOSP IP/OBS MODERATE 55: CPT | Mod: 57

## 2017-04-11 RX ORDER — SODIUM CHLORIDE 9 MG/ML
1000 INJECTION, SOLUTION INTRAVENOUS
Qty: 0 | Refills: 0 | Status: DISCONTINUED | OUTPATIENT
Start: 2017-04-11 | End: 2017-04-12

## 2017-04-11 RX ORDER — ENOXAPARIN SODIUM 100 MG/ML
40 INJECTION SUBCUTANEOUS DAILY
Qty: 0 | Refills: 0 | Status: DISCONTINUED | OUTPATIENT
Start: 2017-04-11 | End: 2017-04-12

## 2017-04-11 RX ADMIN — Medication 1 MILLIGRAM(S): at 12:37

## 2017-04-11 RX ADMIN — Medication 100 MILLIGRAM(S): at 23:06

## 2017-04-11 RX ADMIN — ENOXAPARIN SODIUM 40 MILLIGRAM(S): 100 INJECTION SUBCUTANEOUS at 17:24

## 2017-04-12 ENCOUNTER — APPOINTMENT (OUTPATIENT)
Dept: SURGICAL ONCOLOGY | Facility: HOSPITAL | Age: 57
End: 2017-04-12

## 2017-04-12 VITALS
DIASTOLIC BLOOD PRESSURE: 75 MMHG | RESPIRATION RATE: 20 BRPM | HEART RATE: 99 BPM | SYSTOLIC BLOOD PRESSURE: 133 MMHG | OXYGEN SATURATION: 97 %

## 2017-04-12 LAB
ALBUMIN SERPL ELPH-MCNC: 3.2 G/DL — LOW (ref 3.3–5)
ALP SERPL-CCNC: 150 U/L — HIGH (ref 40–120)
ALT FLD-CCNC: 20 U/L — SIGNIFICANT CHANGE UP (ref 4–41)
APTT BLD: 41.1 SEC — HIGH (ref 27.5–37.4)
AST SERPL-CCNC: 17 U/L — SIGNIFICANT CHANGE UP (ref 4–40)
BASOPHILS # BLD AUTO: 0.04 K/UL — SIGNIFICANT CHANGE UP (ref 0–0.2)
BASOPHILS NFR BLD AUTO: 0.9 % — SIGNIFICANT CHANGE UP (ref 0–2)
BILIRUB SERPL-MCNC: 0.6 MG/DL — SIGNIFICANT CHANGE UP (ref 0.2–1.2)
BLD GP AB SCN SERPL QL: NEGATIVE — SIGNIFICANT CHANGE UP
BUN SERPL-MCNC: 17 MG/DL — SIGNIFICANT CHANGE UP (ref 7–23)
BUN SERPL-MCNC: 17 MG/DL — SIGNIFICANT CHANGE UP (ref 7–23)
CALCIUM SERPL-MCNC: 9.3 MG/DL — SIGNIFICANT CHANGE UP (ref 8.4–10.5)
CALCIUM SERPL-MCNC: 9.3 MG/DL — SIGNIFICANT CHANGE UP (ref 8.4–10.5)
CHLORIDE SERPL-SCNC: 100 MMOL/L — SIGNIFICANT CHANGE UP (ref 98–107)
CHLORIDE SERPL-SCNC: 100 MMOL/L — SIGNIFICANT CHANGE UP (ref 98–107)
CO2 SERPL-SCNC: 24 MMOL/L — SIGNIFICANT CHANGE UP (ref 22–31)
CO2 SERPL-SCNC: 24 MMOL/L — SIGNIFICANT CHANGE UP (ref 22–31)
CREAT SERPL-MCNC: 1.03 MG/DL — SIGNIFICANT CHANGE UP (ref 0.5–1.3)
CREAT SERPL-MCNC: 1.03 MG/DL — SIGNIFICANT CHANGE UP (ref 0.5–1.3)
EOSINOPHIL # BLD AUTO: 0.13 K/UL — SIGNIFICANT CHANGE UP (ref 0–0.5)
EOSINOPHIL NFR BLD AUTO: 2.9 % — SIGNIFICANT CHANGE UP (ref 0–6)
GLUCOSE SERPL-MCNC: 119 MG/DL — HIGH (ref 70–99)
GLUCOSE SERPL-MCNC: 119 MG/DL — HIGH (ref 70–99)
HCT VFR BLD CALC: 29.9 % — LOW (ref 39–50)
HCT VFR BLD CALC: 29.9 % — LOW (ref 39–50)
HGB BLD-MCNC: 9.4 G/DL — LOW (ref 13–17)
HGB BLD-MCNC: 9.4 G/DL — LOW (ref 13–17)
IMM GRANULOCYTES NFR BLD AUTO: 0.4 % — SIGNIFICANT CHANGE UP (ref 0–1.5)
INR BLD: 1.2 — HIGH (ref 0.88–1.17)
LDH SERPL L TO P-CCNC: 80 U/L — LOW (ref 135–225)
LYMPHOCYTES # BLD AUTO: 1.46 K/UL — SIGNIFICANT CHANGE UP (ref 1–3.3)
LYMPHOCYTES # BLD AUTO: 32 % — SIGNIFICANT CHANGE UP (ref 13–44)
MAGNESIUM SERPL-MCNC: 2 MG/DL — SIGNIFICANT CHANGE UP (ref 1.6–2.6)
MCHC RBC-ENTMCNC: 20.9 PG — LOW (ref 27–34)
MCHC RBC-ENTMCNC: 20.9 PG — LOW (ref 27–34)
MCHC RBC-ENTMCNC: 31.4 % — LOW (ref 32–36)
MCHC RBC-ENTMCNC: 31.4 % — LOW (ref 32–36)
MCV RBC AUTO: 66.6 FL — LOW (ref 80–100)
MCV RBC AUTO: 66.6 FL — LOW (ref 80–100)
MONOCYTES # BLD AUTO: 0.88 K/UL — SIGNIFICANT CHANGE UP (ref 0–0.9)
MONOCYTES NFR BLD AUTO: 19.3 % — HIGH (ref 2–14)
NEUTROPHILS # BLD AUTO: 2.03 K/UL — SIGNIFICANT CHANGE UP (ref 1.8–7.4)
NEUTROPHILS NFR BLD AUTO: 44.5 % — SIGNIFICANT CHANGE UP (ref 43–77)
PHOSPHATE SERPL-MCNC: 3.9 MG/DL — SIGNIFICANT CHANGE UP (ref 2.5–4.5)
PLATELET # BLD AUTO: 429 K/UL — HIGH (ref 150–400)
PLATELET # BLD AUTO: 429 K/UL — HIGH (ref 150–400)
PMV BLD: 9 FL — SIGNIFICANT CHANGE UP (ref 7–13)
PMV BLD: 9 FL — SIGNIFICANT CHANGE UP (ref 7–13)
POTASSIUM SERPL-MCNC: 4.2 MMOL/L — SIGNIFICANT CHANGE UP (ref 3.5–5.3)
POTASSIUM SERPL-MCNC: 4.2 MMOL/L — SIGNIFICANT CHANGE UP (ref 3.5–5.3)
POTASSIUM SERPL-SCNC: 4.2 MMOL/L — SIGNIFICANT CHANGE UP (ref 3.5–5.3)
POTASSIUM SERPL-SCNC: 4.2 MMOL/L — SIGNIFICANT CHANGE UP (ref 3.5–5.3)
PROT SERPL-MCNC: 9.7 G/DL — HIGH (ref 6–8.3)
PROTHROM AB SERPL-ACNC: 13.5 SEC — HIGH (ref 9.8–13.1)
RBC # BLD: 4.49 M/UL — SIGNIFICANT CHANGE UP (ref 4.2–5.8)
RBC # BLD: 4.49 M/UL — SIGNIFICANT CHANGE UP (ref 4.2–5.8)
RBC # FLD: 18.6 % — HIGH (ref 10.3–14.5)
RBC # FLD: 18.6 % — HIGH (ref 10.3–14.5)
RH IG SCN BLD-IMP: POSITIVE — SIGNIFICANT CHANGE UP
SODIUM SERPL-SCNC: 138 MMOL/L — SIGNIFICANT CHANGE UP (ref 135–145)
SODIUM SERPL-SCNC: 138 MMOL/L — SIGNIFICANT CHANGE UP (ref 135–145)
WBC # BLD: 4.56 K/UL — SIGNIFICANT CHANGE UP (ref 3.8–10.5)
WBC # BLD: 4.56 K/UL — SIGNIFICANT CHANGE UP (ref 3.8–10.5)
WBC # FLD AUTO: 4.56 K/UL — SIGNIFICANT CHANGE UP (ref 3.8–10.5)
WBC # FLD AUTO: 4.56 K/UL — SIGNIFICANT CHANGE UP (ref 3.8–10.5)

## 2017-04-12 PROCEDURE — 38510 BIOPSY/REMOVAL LYMPH NODES: CPT

## 2017-04-12 PROCEDURE — 88189 FLOWCYTOMETRY/READ 16 & >: CPT

## 2017-04-12 PROCEDURE — 71010: CPT | Mod: 26

## 2017-04-12 PROCEDURE — 99239 HOSP IP/OBS DSCHRG MGMT >30: CPT

## 2017-04-12 PROCEDURE — 88342 IMHCHEM/IMCYTCHM 1ST ANTB: CPT | Mod: 26,59

## 2017-04-12 PROCEDURE — 88333 PATH CONSLTJ SURG CYTO XM 1: CPT | Mod: 26

## 2017-04-12 PROCEDURE — 88365 INSITU HYBRIDIZATION (FISH): CPT | Mod: 26,59

## 2017-04-12 PROCEDURE — 88367 INSITU HYBRIDIZATION AUTO: CPT | Mod: 26

## 2017-04-12 PROCEDURE — 88307 TISSUE EXAM BY PATHOLOGIST: CPT | Mod: 26

## 2017-04-12 PROCEDURE — 88341 IMHCHEM/IMCYTCHM EA ADD ANTB: CPT | Mod: 26,59

## 2017-04-12 PROCEDURE — 88360 TUMOR IMMUNOHISTOCHEM/MANUAL: CPT | Mod: 26

## 2017-04-12 PROCEDURE — 88364 INSITU HYBRIDIZATION (FISH): CPT | Mod: 26

## 2017-04-12 RX ORDER — ALPRAZOLAM 0.25 MG
1 TABLET ORAL
Qty: 60 | Refills: 0 | OUTPATIENT
Start: 2017-04-12 | End: 2017-05-12

## 2017-04-12 RX ORDER — ONDANSETRON 8 MG/1
4 TABLET, FILM COATED ORAL ONCE
Qty: 0 | Refills: 0 | Status: DISCONTINUED | OUTPATIENT
Start: 2017-04-12 | End: 2017-04-12

## 2017-04-12 RX ORDER — SODIUM CHLORIDE 9 MG/ML
1000 INJECTION, SOLUTION INTRAVENOUS
Qty: 0 | Refills: 0 | Status: DISCONTINUED | OUTPATIENT
Start: 2017-04-12 | End: 2017-04-12

## 2017-04-12 RX ORDER — TRAZODONE HCL 50 MG
1 TABLET ORAL
Qty: 30 | Refills: 0 | OUTPATIENT
Start: 2017-04-12 | End: 2017-05-12

## 2017-04-12 RX ORDER — HYDROMORPHONE HYDROCHLORIDE 2 MG/ML
0.5 INJECTION INTRAMUSCULAR; INTRAVENOUS; SUBCUTANEOUS
Qty: 0 | Refills: 0 | Status: DISCONTINUED | OUTPATIENT
Start: 2017-04-12 | End: 2017-04-12

## 2017-04-12 RX ORDER — FENTANYL CITRATE 50 UG/ML
25 INJECTION INTRAVENOUS
Qty: 0 | Refills: 0 | Status: DISCONTINUED | OUTPATIENT
Start: 2017-04-12 | End: 2017-04-12

## 2017-04-12 RX ORDER — ALPRAZOLAM 0.25 MG
1 TABLET ORAL
Qty: 0 | Refills: 0 | COMMUNITY

## 2017-04-12 RX ORDER — FENTANYL CITRATE 50 UG/ML
50 INJECTION INTRAVENOUS
Qty: 0 | Refills: 0 | Status: DISCONTINUED | OUTPATIENT
Start: 2017-04-12 | End: 2017-04-12

## 2017-04-12 RX ORDER — OXYCODONE HYDROCHLORIDE 5 MG/1
1 TABLET ORAL
Qty: 20 | Refills: 0 | OUTPATIENT
Start: 2017-04-12 | End: 2017-04-17

## 2017-04-12 RX ORDER — OXYCODONE HYDROCHLORIDE 5 MG/1
1 TABLET ORAL
Qty: 28 | Refills: 0 | OUTPATIENT
Start: 2017-04-12 | End: 2017-04-19

## 2017-04-12 RX ADMIN — SODIUM CHLORIDE 100 MILLILITER(S): 9 INJECTION, SOLUTION INTRAVENOUS at 00:40

## 2017-04-12 RX ADMIN — SODIUM CHLORIDE 100 MILLILITER(S): 9 INJECTION, SOLUTION INTRAVENOUS at 14:00

## 2017-04-12 RX ADMIN — Medication 1 MILLIGRAM(S): at 00:40

## 2017-04-13 ENCOUNTER — APPOINTMENT (OUTPATIENT)
Dept: SURGERY | Facility: CLINIC | Age: 57
End: 2017-04-13

## 2017-04-14 ENCOUNTER — APPOINTMENT (OUTPATIENT)
Dept: UROLOGY | Facility: CLINIC | Age: 57
End: 2017-04-14

## 2017-04-14 ENCOUNTER — TRANSCRIPTION ENCOUNTER (OUTPATIENT)
Age: 57
End: 2017-04-14

## 2017-04-14 LAB — TM INTERPRETATION: SIGNIFICANT CHANGE UP

## 2017-04-15 ENCOUNTER — INPATIENT (INPATIENT)
Facility: HOSPITAL | Age: 57
LOS: 9 days | Discharge: HOME | End: 2017-04-25
Attending: HOSPITALIST | Admitting: HOSPITALIST
Payer: MEDICAID

## 2017-04-15 VITALS
HEART RATE: 96 BPM | RESPIRATION RATE: 16 BRPM | OXYGEN SATURATION: 96 % | DIASTOLIC BLOOD PRESSURE: 62 MMHG | SYSTOLIC BLOOD PRESSURE: 103 MMHG | TEMPERATURE: 98 F

## 2017-04-15 DIAGNOSIS — R26.81 UNSTEADINESS ON FEET: ICD-10-CM

## 2017-04-15 DIAGNOSIS — F32.9 MAJOR DEPRESSIVE DISORDER, SINGLE EPISODE, UNSPECIFIED: ICD-10-CM

## 2017-04-15 DIAGNOSIS — J34.2 DEVIATED NASAL SEPTUM: Chronic | ICD-10-CM

## 2017-04-15 DIAGNOSIS — N40.1 BENIGN PROSTATIC HYPERPLASIA WITH LOWER URINARY TRACT SYMPTOMS: ICD-10-CM

## 2017-04-15 DIAGNOSIS — Z29.9 ENCOUNTER FOR PROPHYLACTIC MEASURES, UNSPECIFIED: ICD-10-CM

## 2017-04-15 DIAGNOSIS — C85.90 NON-HODGKIN LYMPHOMA, UNSPECIFIED, UNSPECIFIED SITE: ICD-10-CM

## 2017-04-15 LAB
ALBUMIN SERPL ELPH-MCNC: 3 G/DL — LOW (ref 3.3–5)
ALP SERPL-CCNC: 133 U/L — HIGH (ref 40–120)
ALT FLD-CCNC: 21 U/L — SIGNIFICANT CHANGE UP (ref 4–41)
ANISOCYTOSIS BLD QL: SLIGHT — SIGNIFICANT CHANGE UP
APAP SERPL-MCNC: < 15 UG/ML — LOW (ref 15–25)
APTT BLD: 37.5 SEC — HIGH (ref 27.5–37.4)
AST SERPL-CCNC: 36 U/L — SIGNIFICANT CHANGE UP (ref 4–40)
BARBITURATES MEASUREMENT: NEGATIVE — SIGNIFICANT CHANGE UP
BASE EXCESS BLDV CALC-SCNC: 4.4 MMOL/L — SIGNIFICANT CHANGE UP
BASOPHILS # BLD AUTO: 0.03 K/UL — SIGNIFICANT CHANGE UP (ref 0–0.2)
BASOPHILS NFR BLD AUTO: 0.5 % — SIGNIFICANT CHANGE UP (ref 0–2)
BASOPHILS NFR SPEC: 1.7 % — SIGNIFICANT CHANGE UP (ref 0–2)
BENZODIAZ SERPL-MCNC: NEGATIVE — SIGNIFICANT CHANGE UP
BILIRUB SERPL-MCNC: 0.6 MG/DL — SIGNIFICANT CHANGE UP (ref 0.2–1.2)
BLASTS # FLD: 0 % — SIGNIFICANT CHANGE UP (ref 0–0)
BLD GP AB SCN SERPL QL: NEGATIVE — SIGNIFICANT CHANGE UP
BLOOD GAS VENOUS - CREATININE: 0.94 MG/DL — SIGNIFICANT CHANGE UP (ref 0.5–1.3)
BUN SERPL-MCNC: 15 MG/DL — SIGNIFICANT CHANGE UP (ref 7–23)
CALCIUM SERPL-MCNC: 9 MG/DL — SIGNIFICANT CHANGE UP (ref 8.4–10.5)
CHLORIDE BLDV-SCNC: 106 MMOL/L — SIGNIFICANT CHANGE UP (ref 96–108)
CHLORIDE SERPL-SCNC: 98 MMOL/L — SIGNIFICANT CHANGE UP (ref 98–107)
CO2 SERPL-SCNC: 21 MMOL/L — LOW (ref 22–31)
CREAT SERPL-MCNC: 1 MG/DL — SIGNIFICANT CHANGE UP (ref 0.5–1.3)
EOSINOPHIL # BLD AUTO: 0.1 K/UL — SIGNIFICANT CHANGE UP (ref 0–0.5)
EOSINOPHIL NFR BLD AUTO: 1.8 % — SIGNIFICANT CHANGE UP (ref 0–6)
EOSINOPHIL NFR FLD: 2.5 % — SIGNIFICANT CHANGE UP (ref 0–6)
ETHANOL BLD-MCNC: < 10 MG/DL — SIGNIFICANT CHANGE UP
GAS PNL BLDV: 130 MMOL/L — LOW (ref 136–146)
GIANT PLATELETS BLD QL SMEAR: PRESENT — SIGNIFICANT CHANGE UP
GLUCOSE BLDV-MCNC: 98 — SIGNIFICANT CHANGE UP (ref 70–99)
GLUCOSE SERPL-MCNC: 78 MG/DL — SIGNIFICANT CHANGE UP (ref 70–99)
HCO3 BLDV-SCNC: 27 MMOL/L — SIGNIFICANT CHANGE UP (ref 20–27)
HCT VFR BLD CALC: 29.6 % — LOW (ref 39–50)
HCT VFR BLDV CALC: 28.2 % — LOW (ref 39–51)
HGB BLD-MCNC: 9.1 G/DL — LOW (ref 13–17)
HGB BLDV-MCNC: 9.1 G/DL — LOW (ref 13–17)
HYPOCHROMIA BLD QL: SLIGHT — SIGNIFICANT CHANGE UP
IMM GRANULOCYTES NFR BLD AUTO: 0.4 % — SIGNIFICANT CHANGE UP (ref 0–1.5)
INR BLD: 1.15 — SIGNIFICANT CHANGE UP (ref 0.88–1.17)
LACTATE BLDV-MCNC: 1.7 MMOL/L — SIGNIFICANT CHANGE UP (ref 0.5–2)
LYMPHOCYTES # BLD AUTO: 1.22 K/UL — SIGNIFICANT CHANGE UP (ref 1–3.3)
LYMPHOCYTES # BLD AUTO: 21.7 % — SIGNIFICANT CHANGE UP (ref 13–44)
LYMPHOCYTES NFR SPEC AUTO: 17.8 % — SIGNIFICANT CHANGE UP (ref 13–44)
MCHC RBC-ENTMCNC: 20.9 PG — LOW (ref 27–34)
MCHC RBC-ENTMCNC: 30.7 % — LOW (ref 32–36)
MCV RBC AUTO: 68 FL — LOW (ref 80–100)
METAMYELOCYTES # FLD: 0.8 % — SIGNIFICANT CHANGE UP (ref 0–1)
MICROCYTES BLD QL: SLIGHT — SIGNIFICANT CHANGE UP
MONOCYTES # BLD AUTO: 1.07 K/UL — HIGH (ref 0–0.9)
MONOCYTES NFR BLD AUTO: 19 % — HIGH (ref 2–14)
MONOCYTES NFR BLD: 17.8 % — HIGH (ref 2–9)
MYELOCYTES NFR BLD: 0 % — SIGNIFICANT CHANGE UP (ref 0–0)
NEUTROPHIL AB SER-ACNC: 58.5 % — SIGNIFICANT CHANGE UP (ref 43–77)
NEUTROPHILS # BLD AUTO: 3.18 K/UL — SIGNIFICANT CHANGE UP (ref 1.8–7.4)
NEUTROPHILS NFR BLD AUTO: 56.6 % — SIGNIFICANT CHANGE UP (ref 43–77)
NEUTS BAND # BLD: 0 % — SIGNIFICANT CHANGE UP (ref 0–6)
OTHER - HEMATOLOGY %: 0 — SIGNIFICANT CHANGE UP
PCO2 BLDV: 51 MMHG — SIGNIFICANT CHANGE UP (ref 41–51)
PH BLDV: 7.38 PH — SIGNIFICANT CHANGE UP (ref 7.32–7.43)
PLATELET # BLD AUTO: 426 K/UL — HIGH (ref 150–400)
PLATELET COUNT - ESTIMATE: NORMAL — SIGNIFICANT CHANGE UP
PMV BLD: 8.6 FL — SIGNIFICANT CHANGE UP (ref 7–13)
PO2 BLDV: 34 MMHG — LOW (ref 35–40)
POLYCHROMASIA BLD QL SMEAR: SLIGHT — SIGNIFICANT CHANGE UP
POTASSIUM BLDV-SCNC: 4 MMOL/L — SIGNIFICANT CHANGE UP (ref 3.4–4.5)
POTASSIUM SERPL-MCNC: 5.8 MMOL/L — HIGH (ref 3.5–5.3)
POTASSIUM SERPL-SCNC: 5.8 MMOL/L — HIGH (ref 3.5–5.3)
PROMYELOCYTES # FLD: 0 % — SIGNIFICANT CHANGE UP (ref 0–0)
PROT SERPL-MCNC: 9.3 G/DL — HIGH (ref 6–8.3)
PROTHROM AB SERPL-ACNC: 12.9 SEC — SIGNIFICANT CHANGE UP (ref 9.8–13.1)
RBC # BLD: 4.35 M/UL — SIGNIFICANT CHANGE UP (ref 4.2–5.8)
RBC # FLD: 18.9 % — HIGH (ref 10.3–14.5)
RH IG SCN BLD-IMP: POSITIVE — SIGNIFICANT CHANGE UP
SALICYLATES SERPL-MCNC: < 5 MG/DL — LOW (ref 15–30)
SAO2 % BLDV: 58.4 % — LOW (ref 60–85)
SCHISTOCYTES BLD QL AUTO: SLIGHT — SIGNIFICANT CHANGE UP
SODIUM SERPL-SCNC: 134 MMOL/L — LOW (ref 135–145)
TARGETS BLD QL SMEAR: SLIGHT — SIGNIFICANT CHANGE UP
VARIANT LYMPHS # BLD: 0.8 % — SIGNIFICANT CHANGE UP
WBC # BLD: 5.62 K/UL — SIGNIFICANT CHANGE UP (ref 3.8–10.5)
WBC # FLD AUTO: 5.62 K/UL — SIGNIFICANT CHANGE UP (ref 3.8–10.5)

## 2017-04-15 PROCEDURE — 99223 1ST HOSP IP/OBS HIGH 75: CPT | Mod: GC

## 2017-04-15 PROCEDURE — 70450 CT HEAD/BRAIN W/O DYE: CPT | Mod: 26

## 2017-04-15 RX ORDER — ALPRAZOLAM 0.25 MG
1 TABLET ORAL EVERY 12 HOURS
Qty: 0 | Refills: 0 | Status: DISCONTINUED | OUTPATIENT
Start: 2017-04-15 | End: 2017-04-20

## 2017-04-15 RX ORDER — VENLAFAXINE HCL 75 MG
150 CAPSULE, EXT RELEASE 24 HR ORAL DAILY
Qty: 0 | Refills: 0 | Status: DISCONTINUED | OUTPATIENT
Start: 2017-04-15 | End: 2017-04-25

## 2017-04-15 RX ORDER — DULOXETINE HYDROCHLORIDE 30 MG/1
60 CAPSULE, DELAYED RELEASE ORAL DAILY
Qty: 0 | Refills: 0 | Status: DISCONTINUED | OUTPATIENT
Start: 2017-04-15 | End: 2017-04-25

## 2017-04-15 RX ORDER — HEPARIN SODIUM 5000 [USP'U]/ML
5000 INJECTION INTRAVENOUS; SUBCUTANEOUS EVERY 8 HOURS
Qty: 0 | Refills: 0 | Status: DISCONTINUED | OUTPATIENT
Start: 2017-04-15 | End: 2017-04-19

## 2017-04-15 RX ORDER — FINASTERIDE 5 MG/1
5 TABLET, FILM COATED ORAL DAILY
Qty: 0 | Refills: 0 | Status: DISCONTINUED | OUTPATIENT
Start: 2017-04-15 | End: 2017-04-25

## 2017-04-15 RX ORDER — TRAZODONE HCL 50 MG
100 TABLET ORAL AT BEDTIME
Qty: 0 | Refills: 0 | Status: DISCONTINUED | OUTPATIENT
Start: 2017-04-15 | End: 2017-04-25

## 2017-04-15 RX ORDER — INFLUENZA VIRUS VACCINE 15; 15; 15; 15 UG/.5ML; UG/.5ML; UG/.5ML; UG/.5ML
0.5 SUSPENSION INTRAMUSCULAR ONCE
Qty: 0 | Refills: 0 | Status: DISCONTINUED | OUTPATIENT
Start: 2017-04-15 | End: 2017-04-25

## 2017-04-15 RX ADMIN — Medication 100 MILLIGRAM(S): at 21:33

## 2017-04-15 RX ADMIN — HEPARIN SODIUM 5000 UNIT(S): 5000 INJECTION INTRAVENOUS; SUBCUTANEOUS at 21:17

## 2017-04-15 RX ADMIN — FINASTERIDE 5 MILLIGRAM(S): 5 TABLET, FILM COATED ORAL at 21:17

## 2017-04-15 NOTE — ED PROVIDER NOTE - PMH
Anxiety    Benign non-nodular prostatic hyperplasia with lower urinary tract symptoms    Depression    Insomnia    Lymphoma

## 2017-04-15 NOTE — H&P ADULT. - RADIOLOGY RESULTS AND INTERPRETATION
Redemonstration of an extra-axial hyperdense mass along the high right   frontoparietal convexity with mottled lucency of the adjacent calvarium   as well as an adjacent extracalvarial similar-appearing mass. There is   associated parenchymal vasogenic edema and mass effect on the sulci and   right lateral ventricle. No midline shift. Findings are favored to   represent a transosseous neoplasm such as a meningioma. No significant   interval change from 4/5/2017.

## 2017-04-15 NOTE — H&P ADULT. - NECK DETAILS
surgical incision on right neck, clean with clear drainage. mild erythema/supple/normal thyroid gland

## 2017-04-15 NOTE — ED ADULT NURSE NOTE - OBJECTIVE STATEMENT
Pt was brought in to room #7 from CT.  Alert, awake, sleepy, easily arousable to verbal stimuli.  No facial droop, tongue deviation.  Speech clear, intact.  EOM intact without nystagmus.  VSS.  Extremities equally strong 5/5 on x4.  No visible s/s of injury noted.  Breathing unlabored, 98% O2 sat on room air.  Seen by MD Pinto.  Placed on cardiac monitoring, NSR.   All VSS. IV accessed.  Labs sent.  Fall precaution in place.  Will continue to closely monitor.

## 2017-04-15 NOTE — H&P ADULT. - HISTORY OF PRESENT ILLNESS
57M PMH BPH, major depressive disorder, and recent hospital admission 4/12 for diagnosis of lymphoma with brain mets now presents with recurrent falls. Patient reports that since he left the hospital he has fallen 3 times in the past 2 days. Was at PMD today for follow up and told to go to the ED. Reports feeling that his legs are weak and give way causing him to fall. Says that most recently he was smoking a cigarette outside of his house and felt suddenly unstable on his feet, tried to maintain his balance, but could not and fell. Denies any head trauma or LOC. Also reports feeling sometimes dizzy. No CP, palpitations, HA, vision changes. During one of the falls while walking on street, scraped his right hand and has pain on right side lower back. Reports eating and drinking well. Thinks fall are from being in the hospital recently and feeling weak and also from lack of sleep in hospital. Denies any N/V, D/C, abd pain, fevers/chills, no focal neuro motor or sensory deficits.    During most recent hospitalization patient presented with HA and found to have new brain lesion. Also found with diffuse lymphadenopathy. 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 Patient was to follow up with [facility] and [facility] outpatient.    In ED: 98F, 96, 103/62, 16, 96%RA

## 2017-04-15 NOTE — H&P ADULT. - NEGATIVE NEUROLOGICAL SYMPTOMS
no tremors/no focal seizures/no headache/no syncope/no loss of sensation/no paresthesias/no hemiparesis/no vertigo/no confusion/no transient paralysis/no loss of consciousness/no generalized seizures

## 2017-04-15 NOTE — ED PROVIDER NOTE - MEDICAL DECISION MAKING DETAILS
unsteady gait suspect 2/2 to intracranial mass - recent dx lymphoma pt only complains of fall, asymptomatic otherwise.

## 2017-04-15 NOTE — ED ADULT TRIAGE NOTE - CHIEF COMPLAINT QUOTE
pt brought from PMD office by EMS for unsteady gait, and dizziness, pt states recent lymph node biopsy 2 days ago, states onset after procedure, also states "I was having a cigarette and then I realize I was walking backwards, and then fell down," abrasions noted to R forearm, A&Ox3 on presentation, slow speech on presentation, eyes not completely open, MD Kelley called to triage for eval, no stroke code as per MD

## 2017-04-15 NOTE — ED PROVIDER NOTE - ATTENDING CONTRIBUTION TO CARE
[place]  pt with lymphoma  with persistent dizziness and falls  has epidural mass on CT  no interval change over 1 week  is on multiple new medications and has had decreased po intake  exam  pt with symmetrical strength and cerebellar exam  CN nl clear lungs  Card RRR S1S2  LN bx sith rt supraclav  no drainage

## 2017-04-15 NOTE — H&P ADULT. - RS GEN PE MLT RESP DETAILS PC
breath sounds equal/clear to auscultation bilaterally/no rales/no chest wall tenderness/good air movement/no rhonchi/airway patent/respirations non-labored/no wheezes/no intercostal retractions/normal

## 2017-04-15 NOTE — H&P ADULT. - NEUROLOGICAL DETAILS
responds to pain/no spontaneous movement/alert and oriented x 3/cranial nerves intact/responds to verbal commands/sensation intact/normal strength alert and oriented x 3/cranial nerves intact/normal strength/sensation intact

## 2017-04-15 NOTE — H&P ADULT. - GASTROINTESTINAL DETAILS
soft/no distention/no masses palpable/no rebound tenderness/nontender/bowel sounds normal/normal/no guarding

## 2017-04-15 NOTE — H&P ADULT. - LAB RESULTS AND INTERPRETATION
stable anemia Hgb 9.1, Na 134, K 5.8 mod hemolyzed- on VGB 4.0, Labs reviewed: stable anemia Hgb 9.1, Na 134, K 5.8 mod hemolyzed- on VGB 4.0,

## 2017-04-15 NOTE — H&P ADULT. - ASSESSMENT
57M PMH BPH, major depressive disorder, and recent hospital admission 4/12 for diagnosis of lymphoma with brain mets now presents with recurrent falls. Labs at baseline. CT head with stable lesion and associated findings. Falls 2/2 intracranial mass vs. deconditioning.

## 2017-04-15 NOTE — H&P ADULT. - NEGATIVE GASTROINTESTINAL SYMPTOMS
no abdominal pain/no hematochezia/no diarrhea/no melena/no vomiting/no change in bowel habits/no constipation/no nausea

## 2017-04-15 NOTE — H&P ADULT. - PROBLEM SELECTOR PLAN 1
-May be 2/2 intracranial mass causing neurological impairment and gait instability vs. deconditioning from recent hospitalization. No new deficits, stable head imaging.  -Neurosurgery consult in AM  -fall precautions  -PT eval

## 2017-04-15 NOTE — H&P ADULT. - MUSCULOSKELETAL
details… detailed exam normal strength/normal/ROM intact/no joint erythema/no calf tenderness/no joint warmth/no joint swelling

## 2017-04-15 NOTE — ED ADULT NURSE NOTE - CHIEF COMPLAINT
The patient is a 57y Male sent from pmd office by ems for unsteady gait today. Pt reports difficulty maintaining balance and movement, and fell 4 times over the last two days.  Denies any injury but pain on right side of the body.  Denies headache, nausea, vomiting, vision change.  Pt went through R neck biopsy 2 days ago.  History of arthritis, depression.

## 2017-04-15 NOTE — ED PROVIDER NOTE - OBJECTIVE STATEMENT
58 y/o M w/ hx lymphoma w/ mets to brain pf fall.  Pt notes diffiuculty walking since d/c from hospital - fell multiple times in last 24 hours, denies head trauma/LOC.  No recent fever/chills, CP, SOB, d/c.  s/p lymph node biopsy 3 days PTA.

## 2017-04-15 NOTE — H&P ADULT. - PROBLEM SELECTOR PLAN 2
-S/p cervical LN biospy s/w B cell lymphoma.  -consider inpatient Hematology consult in AM for eval of further management and then outpatient follow up.

## 2017-04-16 LAB
ALBUMIN SERPL ELPH-MCNC: 2.9 G/DL — LOW (ref 3.3–5)
ALP SERPL-CCNC: 135 U/L — HIGH (ref 40–120)
ALT FLD-CCNC: 14 U/L — SIGNIFICANT CHANGE UP (ref 4–41)
AST SERPL-CCNC: 14 U/L — SIGNIFICANT CHANGE UP (ref 4–40)
BASOPHILS # BLD AUTO: 0.03 K/UL — SIGNIFICANT CHANGE UP (ref 0–0.2)
BASOPHILS NFR BLD AUTO: 0.7 % — SIGNIFICANT CHANGE UP (ref 0–2)
BILIRUB SERPL-MCNC: 0.7 MG/DL — SIGNIFICANT CHANGE UP (ref 0.2–1.2)
BUN SERPL-MCNC: 16 MG/DL — SIGNIFICANT CHANGE UP (ref 7–23)
CALCIUM SERPL-MCNC: 9.3 MG/DL — SIGNIFICANT CHANGE UP (ref 8.4–10.5)
CHLORIDE SERPL-SCNC: 101 MMOL/L — SIGNIFICANT CHANGE UP (ref 98–107)
CO2 SERPL-SCNC: 22 MMOL/L — SIGNIFICANT CHANGE UP (ref 22–31)
CREAT SERPL-MCNC: 1.05 MG/DL — SIGNIFICANT CHANGE UP (ref 0.5–1.3)
EOSINOPHIL # BLD AUTO: 0.1 K/UL — SIGNIFICANT CHANGE UP (ref 0–0.5)
EOSINOPHIL NFR BLD AUTO: 2.4 % — SIGNIFICANT CHANGE UP (ref 0–6)
GLUCOSE SERPL-MCNC: 130 MG/DL — HIGH (ref 70–99)
HCT VFR BLD CALC: 28.9 % — LOW (ref 39–50)
HGB BLD-MCNC: 9.1 G/DL — LOW (ref 13–17)
IMM GRANULOCYTES NFR BLD AUTO: 0.2 % — SIGNIFICANT CHANGE UP (ref 0–1.5)
LYMPHOCYTES # BLD AUTO: 1.34 K/UL — SIGNIFICANT CHANGE UP (ref 1–3.3)
LYMPHOCYTES # BLD AUTO: 32.3 % — SIGNIFICANT CHANGE UP (ref 13–44)
MAGNESIUM SERPL-MCNC: 1.9 MG/DL — SIGNIFICANT CHANGE UP (ref 1.6–2.6)
MCHC RBC-ENTMCNC: 21.1 PG — LOW (ref 27–34)
MCHC RBC-ENTMCNC: 31.5 % — LOW (ref 32–36)
MCV RBC AUTO: 66.9 FL — LOW (ref 80–100)
MONOCYTES # BLD AUTO: 0.77 K/UL — SIGNIFICANT CHANGE UP (ref 0–0.9)
MONOCYTES NFR BLD AUTO: 18.6 % — HIGH (ref 2–14)
NEUTROPHILS # BLD AUTO: 1.9 K/UL — SIGNIFICANT CHANGE UP (ref 1.8–7.4)
NEUTROPHILS NFR BLD AUTO: 45.8 % — SIGNIFICANT CHANGE UP (ref 43–77)
PHOSPHATE SERPL-MCNC: 5.1 MG/DL — HIGH (ref 2.5–4.5)
PLATELET # BLD AUTO: 433 K/UL — HIGH (ref 150–400)
PMV BLD: 8.7 FL — SIGNIFICANT CHANGE UP (ref 7–13)
POTASSIUM SERPL-MCNC: 4.2 MMOL/L — SIGNIFICANT CHANGE UP (ref 3.5–5.3)
POTASSIUM SERPL-SCNC: 4.2 MMOL/L — SIGNIFICANT CHANGE UP (ref 3.5–5.3)
PROT SERPL-MCNC: 8.9 G/DL — HIGH (ref 6–8.3)
RBC # BLD: 4.32 M/UL — SIGNIFICANT CHANGE UP (ref 4.2–5.8)
RBC # FLD: 18.8 % — HIGH (ref 10.3–14.5)
SODIUM SERPL-SCNC: 137 MMOL/L — SIGNIFICANT CHANGE UP (ref 135–145)
WBC # BLD: 4.15 K/UL — SIGNIFICANT CHANGE UP (ref 3.8–10.5)
WBC # FLD AUTO: 4.15 K/UL — SIGNIFICANT CHANGE UP (ref 3.8–10.5)

## 2017-04-16 PROCEDURE — 99233 SBSQ HOSP IP/OBS HIGH 50: CPT | Mod: GC

## 2017-04-16 RX ORDER — DEXAMETHASONE 0.5 MG/5ML
4 ELIXIR ORAL EVERY 6 HOURS
Qty: 0 | Refills: 0 | Status: DISCONTINUED | OUTPATIENT
Start: 2017-04-16 | End: 2017-04-19

## 2017-04-16 RX ADMIN — DULOXETINE HYDROCHLORIDE 60 MILLIGRAM(S): 30 CAPSULE, DELAYED RELEASE ORAL at 12:36

## 2017-04-16 RX ADMIN — Medication 1 MILLIGRAM(S): at 21:45

## 2017-04-16 RX ADMIN — Medication 4 MILLIGRAM(S): at 17:24

## 2017-04-16 RX ADMIN — Medication 4 MILLIGRAM(S): at 23:01

## 2017-04-16 RX ADMIN — Medication 100 MILLIGRAM(S): at 21:46

## 2017-04-16 RX ADMIN — HEPARIN SODIUM 5000 UNIT(S): 5000 INJECTION INTRAVENOUS; SUBCUTANEOUS at 05:03

## 2017-04-16 RX ADMIN — HEPARIN SODIUM 5000 UNIT(S): 5000 INJECTION INTRAVENOUS; SUBCUTANEOUS at 12:36

## 2017-04-16 RX ADMIN — Medication 150 MILLIGRAM(S): at 12:36

## 2017-04-16 RX ADMIN — HEPARIN SODIUM 5000 UNIT(S): 5000 INJECTION INTRAVENOUS; SUBCUTANEOUS at 21:46

## 2017-04-16 RX ADMIN — FINASTERIDE 5 MILLIGRAM(S): 5 TABLET, FILM COATED ORAL at 12:36

## 2017-04-16 NOTE — PHYSICAL THERAPY INITIAL EVALUATION ADULT - PERTINENT HX OF CURRENT PROBLEM, REHAB EVAL
admitted from home home with increase frequency of falls, recently hospitalized w/ lymphoma, brain mets

## 2017-04-17 ENCOUNTER — RESULT REVIEW (OUTPATIENT)
Age: 57
End: 2017-04-17

## 2017-04-17 ENCOUNTER — APPOINTMENT (OUTPATIENT)
Age: 57
End: 2017-04-17

## 2017-04-17 LAB
ALBUMIN SERPL ELPH-MCNC: 3.3 G/DL — SIGNIFICANT CHANGE UP (ref 3.3–5)
ALP SERPL-CCNC: 146 U/L — HIGH (ref 40–120)
ALT FLD-CCNC: 15 U/L — SIGNIFICANT CHANGE UP (ref 4–41)
AST SERPL-CCNC: 17 U/L — SIGNIFICANT CHANGE UP (ref 4–40)
BILIRUB SERPL-MCNC: 0.6 MG/DL — SIGNIFICANT CHANGE UP (ref 0.2–1.2)
BUN SERPL-MCNC: 17 MG/DL — SIGNIFICANT CHANGE UP (ref 7–23)
CALCIUM SERPL-MCNC: 9.3 MG/DL — SIGNIFICANT CHANGE UP (ref 8.4–10.5)
CHLORIDE SERPL-SCNC: 97 MMOL/L — LOW (ref 98–107)
CO2 SERPL-SCNC: 22 MMOL/L — SIGNIFICANT CHANGE UP (ref 22–31)
CREAT SERPL-MCNC: 0.91 MG/DL — SIGNIFICANT CHANGE UP (ref 0.5–1.3)
GLUCOSE SERPL-MCNC: 196 MG/DL — HIGH (ref 70–99)
HCT VFR BLD CALC: 31.2 % — LOW (ref 39–50)
HEMATOPATHOLOGY REPORT: SIGNIFICANT CHANGE UP
HGB BLD-MCNC: 9.5 G/DL — LOW (ref 13–17)
MAGNESIUM SERPL-MCNC: 1.9 MG/DL — SIGNIFICANT CHANGE UP (ref 1.6–2.6)
MCHC RBC-ENTMCNC: 20.8 PG — LOW (ref 27–34)
MCHC RBC-ENTMCNC: 30.4 % — LOW (ref 32–36)
MCV RBC AUTO: 68.4 FL — LOW (ref 80–100)
PHOSPHATE SERPL-MCNC: 3.7 MG/DL — SIGNIFICANT CHANGE UP (ref 2.5–4.5)
PLATELET # BLD AUTO: 446 K/UL — HIGH (ref 150–400)
PMV BLD: 9.2 FL — SIGNIFICANT CHANGE UP (ref 7–13)
POTASSIUM SERPL-MCNC: 4.2 MMOL/L — SIGNIFICANT CHANGE UP (ref 3.5–5.3)
POTASSIUM SERPL-SCNC: 4.2 MMOL/L — SIGNIFICANT CHANGE UP (ref 3.5–5.3)
PROT SERPL-MCNC: 10 G/DL — HIGH (ref 6–8.3)
RBC # BLD: 4.56 M/UL — SIGNIFICANT CHANGE UP (ref 4.2–5.8)
RBC # FLD: 18.7 % — HIGH (ref 10.3–14.5)
SODIUM SERPL-SCNC: 135 MMOL/L — SIGNIFICANT CHANGE UP (ref 135–145)
WBC # BLD: 4.07 K/UL — SIGNIFICANT CHANGE UP (ref 3.8–10.5)
WBC # FLD AUTO: 4.07 K/UL — SIGNIFICANT CHANGE UP (ref 3.8–10.5)

## 2017-04-17 PROCEDURE — 99233 SBSQ HOSP IP/OBS HIGH 50: CPT | Mod: GC

## 2017-04-17 RX ADMIN — Medication 1 MILLIGRAM(S): at 21:21

## 2017-04-17 RX ADMIN — FINASTERIDE 5 MILLIGRAM(S): 5 TABLET, FILM COATED ORAL at 13:59

## 2017-04-17 RX ADMIN — HEPARIN SODIUM 5000 UNIT(S): 5000 INJECTION INTRAVENOUS; SUBCUTANEOUS at 21:18

## 2017-04-17 RX ADMIN — Medication 4 MILLIGRAM(S): at 17:09

## 2017-04-17 RX ADMIN — Medication 4 MILLIGRAM(S): at 13:59

## 2017-04-17 RX ADMIN — HEPARIN SODIUM 5000 UNIT(S): 5000 INJECTION INTRAVENOUS; SUBCUTANEOUS at 13:59

## 2017-04-17 RX ADMIN — Medication 4 MILLIGRAM(S): at 05:38

## 2017-04-17 RX ADMIN — Medication 150 MILLIGRAM(S): at 14:01

## 2017-04-17 RX ADMIN — Medication 100 MILLIGRAM(S): at 21:18

## 2017-04-17 RX ADMIN — DULOXETINE HYDROCHLORIDE 60 MILLIGRAM(S): 30 CAPSULE, DELAYED RELEASE ORAL at 13:59

## 2017-04-18 ENCOUNTER — OUTPATIENT (OUTPATIENT)
Dept: OUTPATIENT SERVICES | Facility: HOSPITAL | Age: 57
LOS: 1 days | Discharge: HOME | End: 2017-04-18
Payer: MEDICAID

## 2017-04-18 ENCOUNTER — RESULT REVIEW (OUTPATIENT)
Age: 57
End: 2017-04-18

## 2017-04-18 DIAGNOSIS — C85.88 OTHER SPECIFIED TYPES OF NON-HODGKIN LYMPHOMA, LYMPH NODES OF MULTIPLE SITES: ICD-10-CM

## 2017-04-18 DIAGNOSIS — J34.2 DEVIATED NASAL SEPTUM: Chronic | ICD-10-CM

## 2017-04-18 PROBLEM — C85.90 NON-HODGKIN LYMPHOMA, UNSPECIFIED, UNSPECIFIED SITE: Chronic | Status: ACTIVE | Noted: 2017-04-15

## 2017-04-18 LAB
APTT BLD: 36.2 SEC — SIGNIFICANT CHANGE UP (ref 27.5–37.4)
BUN SERPL-MCNC: 23 MG/DL — SIGNIFICANT CHANGE UP (ref 7–23)
CALCIUM SERPL-MCNC: 9.3 MG/DL — SIGNIFICANT CHANGE UP (ref 8.4–10.5)
CHLORIDE SERPL-SCNC: 98 MMOL/L — SIGNIFICANT CHANGE UP (ref 98–107)
CO2 SERPL-SCNC: 21 MMOL/L — LOW (ref 22–31)
CREAT SERPL-MCNC: 0.94 MG/DL — SIGNIFICANT CHANGE UP (ref 0.5–1.3)
GLUCOSE SERPL-MCNC: 206 MG/DL — HIGH (ref 70–99)
HCT VFR BLD CALC: 29.8 % — LOW (ref 39–50)
HGB BLD-MCNC: 9.1 G/DL — LOW (ref 13–17)
IGA FLD-MCNC: 81 MG/DL — SIGNIFICANT CHANGE UP (ref 70–400)
IGG FLD-MCNC: 2024 MG/DL — HIGH (ref 700–1600)
IGM SERPL-MCNC: 3144 MG/DL — HIGH (ref 40–230)
INR BLD: 1.2 — HIGH (ref 0.88–1.17)
KAPPA FREE LIGHT CHAINS, SERUM: 170 MG/DL — HIGH (ref 0.33–1.94)
LAMBDA FREE LIGHT CHAINS, SERUM: 2.43 MG/DL — SIGNIFICANT CHANGE UP (ref 0.57–2.63)
MAGNESIUM SERPL-MCNC: 1.8 MG/DL — SIGNIFICANT CHANGE UP (ref 1.6–2.6)
MCHC RBC-ENTMCNC: 21.1 PG — LOW (ref 27–34)
MCHC RBC-ENTMCNC: 30.5 % — LOW (ref 32–36)
MCV RBC AUTO: 69.1 FL — LOW (ref 80–100)
PHOSPHATE SERPL-MCNC: 3.9 MG/DL — SIGNIFICANT CHANGE UP (ref 2.5–4.5)
PLATELET # BLD AUTO: 439 K/UL — HIGH (ref 150–400)
PMV BLD: 9 FL — SIGNIFICANT CHANGE UP (ref 7–13)
POTASSIUM SERPL-MCNC: 4.4 MMOL/L — SIGNIFICANT CHANGE UP (ref 3.5–5.3)
POTASSIUM SERPL-SCNC: 4.4 MMOL/L — SIGNIFICANT CHANGE UP (ref 3.5–5.3)
PROT SERPL-MCNC: 9.7 G/DL — HIGH (ref 6–8.3)
PROTHROM AB SERPL-ACNC: 13.5 SEC — HIGH (ref 9.8–13.1)
RBC # BLD: 4.31 M/UL — SIGNIFICANT CHANGE UP (ref 4.2–5.8)
RBC # FLD: 18.8 % — HIGH (ref 10.3–14.5)
SODIUM SERPL-SCNC: 135 MMOL/L — SIGNIFICANT CHANGE UP (ref 135–145)
WBC # BLD: 8.71 K/UL — SIGNIFICANT CHANGE UP (ref 3.8–10.5)
WBC # FLD AUTO: 8.71 K/UL — SIGNIFICANT CHANGE UP (ref 3.8–10.5)

## 2017-04-18 PROCEDURE — 88367 INSITU HYBRIDIZATION AUTO: CPT | Mod: 26

## 2017-04-18 PROCEDURE — 88313 SPECIAL STAINS GROUP 2: CPT | Mod: 26

## 2017-04-18 PROCEDURE — 88189 FLOWCYTOMETRY/READ 16 & >: CPT

## 2017-04-18 PROCEDURE — 88364 INSITU HYBRIDIZATION (FISH): CPT | Mod: 26

## 2017-04-18 PROCEDURE — 88305 TISSUE EXAM BY PATHOLOGIST: CPT | Mod: 26

## 2017-04-18 PROCEDURE — 88360 TUMOR IMMUNOHISTOCHEM/MANUAL: CPT | Mod: 26

## 2017-04-18 PROCEDURE — 86334 IMMUNOFIX E-PHORESIS SERUM: CPT | Mod: 26

## 2017-04-18 PROCEDURE — 85097 BONE MARROW INTERPRETATION: CPT

## 2017-04-18 PROCEDURE — 88341 IMHCHEM/IMCYTCHM EA ADD ANTB: CPT | Mod: 26,59

## 2017-04-18 PROCEDURE — 99233 SBSQ HOSP IP/OBS HIGH 50: CPT | Mod: GC

## 2017-04-18 PROCEDURE — 88342 IMHCHEM/IMCYTCHM 1ST ANTB: CPT | Mod: 26,59

## 2017-04-18 PROCEDURE — 88365 INSITU HYBRIDIZATION (FISH): CPT | Mod: 26,59

## 2017-04-18 PROCEDURE — 99222 1ST HOSP IP/OBS MODERATE 55: CPT

## 2017-04-18 PROCEDURE — 84165 PROTEIN E-PHORESIS SERUM: CPT | Mod: 26

## 2017-04-18 RX ORDER — LIDOCAINE HCL 20 MG/ML
10 VIAL (ML) INJECTION ONCE
Qty: 0 | Refills: 0 | Status: DISCONTINUED | OUTPATIENT
Start: 2017-04-18 | End: 2017-04-18

## 2017-04-18 RX ORDER — HEPARIN SODIUM 5000 [USP'U]/ML
5000 INJECTION INTRAVENOUS; SUBCUTANEOUS ONCE
Qty: 0 | Refills: 0 | Status: COMPLETED | OUTPATIENT
Start: 2017-04-18 | End: 2017-04-18

## 2017-04-18 RX ORDER — LIDOCAINE HCL 20 MG/ML
10 VIAL (ML) INJECTION ONCE
Qty: 0 | Refills: 0 | Status: DISCONTINUED | OUTPATIENT
Start: 2017-04-18 | End: 2017-04-25

## 2017-04-18 RX ADMIN — Medication 4 MILLIGRAM(S): at 05:33

## 2017-04-18 RX ADMIN — Medication 4 MILLIGRAM(S): at 00:17

## 2017-04-18 RX ADMIN — FINASTERIDE 5 MILLIGRAM(S): 5 TABLET, FILM COATED ORAL at 15:07

## 2017-04-18 RX ADMIN — HEPARIN SODIUM 5000 UNIT(S): 5000 INJECTION INTRAVENOUS; SUBCUTANEOUS at 05:32

## 2017-04-18 RX ADMIN — Medication 4 MILLIGRAM(S): at 15:08

## 2017-04-18 RX ADMIN — HEPARIN SODIUM 5000 UNIT(S): 5000 INJECTION INTRAVENOUS; SUBCUTANEOUS at 15:09

## 2017-04-18 RX ADMIN — HEPARIN SODIUM 5000 UNIT(S): 5000 INJECTION INTRAVENOUS; SUBCUTANEOUS at 11:27

## 2017-04-18 RX ADMIN — Medication 150 MILLIGRAM(S): at 15:09

## 2017-04-18 RX ADMIN — DULOXETINE HYDROCHLORIDE 60 MILLIGRAM(S): 30 CAPSULE, DELAYED RELEASE ORAL at 15:09

## 2017-04-18 RX ADMIN — Medication 4 MILLIGRAM(S): at 18:15

## 2017-04-18 RX ADMIN — Medication 100 MILLIGRAM(S): at 21:37

## 2017-04-19 LAB
KAPPA/LAMBDA FREE LIGHT CHAIN RATIO, SERUM: SIGNIFICANT CHANGE UP
PROT UR-MCNC: 41.5 MG/DL — SIGNIFICANT CHANGE UP

## 2017-04-19 PROCEDURE — 99233 SBSQ HOSP IP/OBS HIGH 50: CPT | Mod: GC

## 2017-04-19 PROCEDURE — 86335 IMMUNFIX E-PHORSIS/URINE/CSF: CPT | Mod: 26

## 2017-04-19 PROCEDURE — 84166 PROTEIN E-PHORESIS/URINE/CSF: CPT | Mod: 26

## 2017-04-19 PROCEDURE — 70450 CT HEAD/BRAIN W/O DYE: CPT | Mod: 26

## 2017-04-19 RX ORDER — HEPARIN SODIUM 5000 [USP'U]/ML
5000 INJECTION INTRAVENOUS; SUBCUTANEOUS EVERY 8 HOURS
Qty: 0 | Refills: 0 | Status: DISCONTINUED | OUTPATIENT
Start: 2017-04-19 | End: 2017-04-19

## 2017-04-19 RX ORDER — ENOXAPARIN SODIUM 100 MG/ML
40 INJECTION SUBCUTANEOUS EVERY 24 HOURS
Qty: 0 | Refills: 0 | Status: DISCONTINUED | OUTPATIENT
Start: 2017-04-19 | End: 2017-04-20

## 2017-04-19 RX ADMIN — DULOXETINE HYDROCHLORIDE 60 MILLIGRAM(S): 30 CAPSULE, DELAYED RELEASE ORAL at 11:52

## 2017-04-19 RX ADMIN — Medication 150 MILLIGRAM(S): at 11:52

## 2017-04-19 RX ADMIN — Medication 100 MILLIGRAM(S): at 21:13

## 2017-04-19 RX ADMIN — Medication 4 MILLIGRAM(S): at 00:00

## 2017-04-19 RX ADMIN — FINASTERIDE 5 MILLIGRAM(S): 5 TABLET, FILM COATED ORAL at 11:52

## 2017-04-19 RX ADMIN — Medication 4 MILLIGRAM(S): at 05:22

## 2017-04-19 RX ADMIN — Medication 1 MILLIGRAM(S): at 10:28

## 2017-04-19 RX ADMIN — Medication 1 MILLIGRAM(S): at 23:09

## 2017-04-20 ENCOUNTER — APPOINTMENT (OUTPATIENT)
Dept: HEMATOLOGY ONCOLOGY | Facility: CLINIC | Age: 57
End: 2017-04-20

## 2017-04-20 ENCOUNTER — RESULT REVIEW (OUTPATIENT)
Age: 57
End: 2017-04-20

## 2017-04-20 LAB
BLD GP AB SCN SERPL QL: NEGATIVE — SIGNIFICANT CHANGE UP
BUN SERPL-MCNC: 21 MG/DL — SIGNIFICANT CHANGE UP (ref 7–23)
CALCIUM SERPL-MCNC: 9.1 MG/DL — SIGNIFICANT CHANGE UP (ref 8.4–10.5)
CHLORIDE SERPL-SCNC: 94 MMOL/L — LOW (ref 98–107)
CO2 SERPL-SCNC: 27 MMOL/L — SIGNIFICANT CHANGE UP (ref 22–31)
CREAT ?TM UR-MCNC: 106.11 MG/DL — SIGNIFICANT CHANGE UP
CREAT SERPL-MCNC: 0.92 MG/DL — SIGNIFICANT CHANGE UP (ref 0.5–1.3)
GAS PNL BLDMV: SIGNIFICANT CHANGE UP
GLUCOSE SERPL-MCNC: 208 MG/DL — HIGH (ref 70–99)
HCT VFR BLD CALC: 31 % — LOW (ref 39–50)
HGB BLD-MCNC: 9.4 G/DL — LOW (ref 13–17)
INR BLD: 1.21 — HIGH (ref 0.88–1.17)
MAGNESIUM SERPL-MCNC: 1.9 MG/DL — SIGNIFICANT CHANGE UP (ref 1.6–2.6)
MCHC RBC-ENTMCNC: 20.8 PG — LOW (ref 27–34)
MCHC RBC-ENTMCNC: 30.3 % — LOW (ref 32–36)
MCV RBC AUTO: 68.7 FL — LOW (ref 80–100)
OSMOLALITY UR: 665 MOSMO/KG — SIGNIFICANT CHANGE UP (ref 50–1200)
PHOSPHATE SERPL-MCNC: 2.9 MG/DL — SIGNIFICANT CHANGE UP (ref 2.5–4.5)
PLATELET # BLD AUTO: 465 K/UL — HIGH (ref 150–400)
PMV BLD: 8.9 FL — SIGNIFICANT CHANGE UP (ref 7–13)
POTASSIUM SERPL-MCNC: 3.8 MMOL/L — SIGNIFICANT CHANGE UP (ref 3.5–5.3)
POTASSIUM SERPL-SCNC: 3.8 MMOL/L — SIGNIFICANT CHANGE UP (ref 3.5–5.3)
PROTHROM AB SERPL-ACNC: 13.6 SEC — HIGH (ref 9.8–13.1)
RBC # BLD: 4.51 M/UL — SIGNIFICANT CHANGE UP (ref 4.2–5.8)
RBC # FLD: 18.9 % — HIGH (ref 10.3–14.5)
RH IG SCN BLD-IMP: POSITIVE — SIGNIFICANT CHANGE UP
SODIUM SERPL-SCNC: 133 MMOL/L — LOW (ref 135–145)
SODIUM UR-SCNC: 68 MEQ/L — SIGNIFICANT CHANGE UP
TM INTERPRETATION: SIGNIFICANT CHANGE UP
WBC # BLD: 7.7 K/UL — SIGNIFICANT CHANGE UP (ref 3.8–10.5)
WBC # FLD AUTO: 7.7 K/UL — SIGNIFICANT CHANGE UP (ref 3.8–10.5)

## 2017-04-20 PROCEDURE — 99233 SBSQ HOSP IP/OBS HIGH 50: CPT | Mod: GC

## 2017-04-20 PROCEDURE — 70553 MRI BRAIN STEM W/O & W/DYE: CPT | Mod: 26

## 2017-04-20 PROCEDURE — 93010 ELECTROCARDIOGRAM REPORT: CPT

## 2017-04-20 RX ORDER — DEXTROSE MONOHYDRATE, SODIUM CHLORIDE, AND POTASSIUM CHLORIDE 50; .745; 4.5 G/1000ML; G/1000ML; G/1000ML
1000 INJECTION, SOLUTION INTRAVENOUS
Qty: 0 | Refills: 0 | Status: DISCONTINUED | OUTPATIENT
Start: 2017-04-20 | End: 2017-04-21

## 2017-04-20 RX ORDER — PHYTONADIONE (VIT K1) 5 MG
5 TABLET ORAL ONCE
Qty: 0 | Refills: 0 | Status: COMPLETED | OUTPATIENT
Start: 2017-04-20 | End: 2017-04-20

## 2017-04-20 RX ORDER — DEXTROSE MONOHYDRATE, SODIUM CHLORIDE, AND POTASSIUM CHLORIDE 50; .745; 4.5 G/1000ML; G/1000ML; G/1000ML
1000 INJECTION, SOLUTION INTRAVENOUS
Qty: 0 | Refills: 0 | Status: DISCONTINUED | OUTPATIENT
Start: 2017-04-20 | End: 2017-04-20

## 2017-04-20 RX ORDER — ALPRAZOLAM 0.25 MG
1 TABLET ORAL EVERY 12 HOURS
Qty: 0 | Refills: 0 | Status: DISCONTINUED | OUTPATIENT
Start: 2017-04-20 | End: 2017-04-25

## 2017-04-20 RX ADMIN — FINASTERIDE 5 MILLIGRAM(S): 5 TABLET, FILM COATED ORAL at 11:17

## 2017-04-20 RX ADMIN — DULOXETINE HYDROCHLORIDE 60 MILLIGRAM(S): 30 CAPSULE, DELAYED RELEASE ORAL at 11:17

## 2017-04-20 RX ADMIN — Medication 5 MILLIGRAM(S): at 23:58

## 2017-04-20 RX ADMIN — Medication 1 MILLIGRAM(S): at 11:20

## 2017-04-20 RX ADMIN — Medication 1 MILLIGRAM(S): at 22:07

## 2017-04-20 RX ADMIN — Medication 100 MILLIGRAM(S): at 22:07

## 2017-04-20 RX ADMIN — Medication 150 MILLIGRAM(S): at 11:17

## 2017-04-20 RX ADMIN — DEXTROSE MONOHYDRATE, SODIUM CHLORIDE, AND POTASSIUM CHLORIDE 100 MILLILITER(S): 50; .745; 4.5 INJECTION, SOLUTION INTRAVENOUS at 23:58

## 2017-04-21 ENCOUNTER — RESULT REVIEW (OUTPATIENT)
Age: 57
End: 2017-04-21

## 2017-04-21 LAB
APTT BLD: 37.6 SEC — HIGH (ref 27.5–37.4)
BUN SERPL-MCNC: 21 MG/DL — SIGNIFICANT CHANGE UP (ref 7–23)
CALCIUM SERPL-MCNC: 8.8 MG/DL — SIGNIFICANT CHANGE UP (ref 8.4–10.5)
CHLORIDE SERPL-SCNC: 98 MMOL/L — SIGNIFICANT CHANGE UP (ref 98–107)
CO2 SERPL-SCNC: 24 MMOL/L — SIGNIFICANT CHANGE UP (ref 22–31)
CREAT SERPL-MCNC: 0.92 MG/DL — SIGNIFICANT CHANGE UP (ref 0.5–1.3)
GLUCOSE SERPL-MCNC: 170 MG/DL — HIGH (ref 70–99)
INR BLD: 1.23 — HIGH (ref 0.88–1.17)
POTASSIUM SERPL-MCNC: 4.1 MMOL/L — SIGNIFICANT CHANGE UP (ref 3.5–5.3)
POTASSIUM SERPL-SCNC: 4.1 MMOL/L — SIGNIFICANT CHANGE UP (ref 3.5–5.3)
PROTHROM AB SERPL-ACNC: 13.8 SEC — HIGH (ref 9.8–13.1)
SODIUM SERPL-SCNC: 134 MMOL/L — LOW (ref 135–145)

## 2017-04-21 PROCEDURE — 88364 INSITU HYBRIDIZATION (FISH): CPT | Mod: 26

## 2017-04-21 PROCEDURE — 88189 FLOWCYTOMETRY/READ 16 & >: CPT

## 2017-04-21 PROCEDURE — 88334 PATH CONSLTJ SURG CYTO XM EA: CPT | Mod: 26,59

## 2017-04-21 PROCEDURE — 88342 IMHCHEM/IMCYTCHM 1ST ANTB: CPT | Mod: 26,59

## 2017-04-21 PROCEDURE — 88307 TISSUE EXAM BY PATHOLOGIST: CPT | Mod: 26

## 2017-04-21 PROCEDURE — 88360 TUMOR IMMUNOHISTOCHEM/MANUAL: CPT | Mod: 26

## 2017-04-21 PROCEDURE — 88333 PATH CONSLTJ SURG CYTO XM 1: CPT | Mod: 26

## 2017-04-21 PROCEDURE — 88367 INSITU HYBRIDIZATION AUTO: CPT | Mod: 26

## 2017-04-21 PROCEDURE — 88341 IMHCHEM/IMCYTCHM EA ADD ANTB: CPT | Mod: 26,59

## 2017-04-21 PROCEDURE — 99233 SBSQ HOSP IP/OBS HIGH 50: CPT | Mod: GC

## 2017-04-21 PROCEDURE — 88365 INSITU HYBRIDIZATION (FISH): CPT | Mod: 26,59

## 2017-04-21 RX ORDER — DEXTROSE MONOHYDRATE, SODIUM CHLORIDE, AND POTASSIUM CHLORIDE 50; .745; 4.5 G/1000ML; G/1000ML; G/1000ML
1000 INJECTION, SOLUTION INTRAVENOUS
Qty: 0 | Refills: 0 | Status: DISCONTINUED | OUTPATIENT
Start: 2017-04-21 | End: 2017-04-22

## 2017-04-21 RX ORDER — CEFAZOLIN SODIUM 1 G
2000 VIAL (EA) INJECTION EVERY 8 HOURS
Qty: 0 | Refills: 0 | Status: COMPLETED | OUTPATIENT
Start: 2017-04-21 | End: 2017-04-22

## 2017-04-21 RX ADMIN — DULOXETINE HYDROCHLORIDE 60 MILLIGRAM(S): 30 CAPSULE, DELAYED RELEASE ORAL at 12:19

## 2017-04-21 RX ADMIN — Medication 100 MILLIGRAM(S): at 22:33

## 2017-04-21 RX ADMIN — Medication 1 MILLIGRAM(S): at 12:24

## 2017-04-21 RX ADMIN — FINASTERIDE 5 MILLIGRAM(S): 5 TABLET, FILM COATED ORAL at 12:19

## 2017-04-21 RX ADMIN — DEXTROSE MONOHYDRATE, SODIUM CHLORIDE, AND POTASSIUM CHLORIDE 100 MILLILITER(S): 50; .745; 4.5 INJECTION, SOLUTION INTRAVENOUS at 08:38

## 2017-04-21 RX ADMIN — Medication 100 MILLIGRAM(S): at 22:49

## 2017-04-21 RX ADMIN — Medication 150 MILLIGRAM(S): at 12:19

## 2017-04-21 NOTE — BRIEF OPERATIVE NOTE - POST-OP DX
Lymphoma  04/21/2017    Active  Dahiana Zapata  Scalp mass  04/21/2017  w/ intracranial extension  Active  Dahiana Zapata

## 2017-04-21 NOTE — PRE-OP CHECKLIST - 2.
right neck healed incision from surgery last week? right neck healed incision from surgery last week

## 2017-04-22 LAB
APTT BLD: 34 SEC — SIGNIFICANT CHANGE UP (ref 27.5–37.4)
BUN SERPL-MCNC: 22 MG/DL — SIGNIFICANT CHANGE UP (ref 7–23)
CALCIUM SERPL-MCNC: 9 MG/DL — SIGNIFICANT CHANGE UP (ref 8.4–10.5)
CHLORIDE SERPL-SCNC: 96 MMOL/L — LOW (ref 98–107)
CO2 SERPL-SCNC: 22 MMOL/L — SIGNIFICANT CHANGE UP (ref 22–31)
CREAT SERPL-MCNC: 1.02 MG/DL — SIGNIFICANT CHANGE UP (ref 0.5–1.3)
DEPRECATED KAPPA LC FREE/LAMBDA SER: 74.68 — HIGH (ref 2.04–10.37)
GLUCOSE SERPL-MCNC: 307 MG/DL — HIGH (ref 70–99)
HCT VFR BLD CALC: 29.9 % — LOW (ref 39–50)
HGB BLD-MCNC: 9.1 G/DL — LOW (ref 13–17)
INR BLD: 1.2 — HIGH (ref 0.88–1.17)
KAPPA LC UR-MCNC: 1180 MG/L — HIGH (ref 1.35–24.19)
LAMBDA LC UR-MCNC: 15.8 MG/L — HIGH (ref 0.24–6.66)
MAGNESIUM SERPL-MCNC: 2 MG/DL — SIGNIFICANT CHANGE UP (ref 1.6–2.6)
MCHC RBC-ENTMCNC: 20.8 PG — LOW (ref 27–34)
MCHC RBC-ENTMCNC: 30.4 % — LOW (ref 32–36)
MCV RBC AUTO: 68.4 FL — LOW (ref 80–100)
OSMOLALITY SERPL: 298 MOSMO/KG — HIGH (ref 275–295)
PHOSPHATE SERPL-MCNC: 4.3 MG/DL — SIGNIFICANT CHANGE UP (ref 2.5–4.5)
PLATELET # BLD AUTO: 425 K/UL — HIGH (ref 150–400)
PMV BLD: 8.5 FL — SIGNIFICANT CHANGE UP (ref 7–13)
POTASSIUM SERPL-MCNC: 4.5 MMOL/L — SIGNIFICANT CHANGE UP (ref 3.5–5.3)
POTASSIUM SERPL-SCNC: 4.5 MMOL/L — SIGNIFICANT CHANGE UP (ref 3.5–5.3)
PROTHROM AB SERPL-ACNC: 13.5 SEC — HIGH (ref 9.8–13.1)
RBC # BLD: 4.37 M/UL — SIGNIFICANT CHANGE UP (ref 4.2–5.8)
RBC # FLD: 19.1 % — HIGH (ref 10.3–14.5)
SODIUM SERPL-SCNC: 132 MMOL/L — LOW (ref 135–145)
WBC # BLD: 4.78 K/UL — SIGNIFICANT CHANGE UP (ref 3.8–10.5)
WBC # FLD AUTO: 4.78 K/UL — SIGNIFICANT CHANGE UP (ref 3.8–10.5)

## 2017-04-22 PROCEDURE — 99233 SBSQ HOSP IP/OBS HIGH 50: CPT | Mod: GC

## 2017-04-22 RX ORDER — LANOLIN ALCOHOL/MO/W.PET/CERES
3 CREAM (GRAM) TOPICAL AT BEDTIME
Qty: 0 | Refills: 0 | Status: DISCONTINUED | OUTPATIENT
Start: 2017-04-22 | End: 2017-04-25

## 2017-04-22 RX ORDER — ACETAMINOPHEN 500 MG
650 TABLET ORAL EVERY 6 HOURS
Qty: 0 | Refills: 0 | Status: DISCONTINUED | OUTPATIENT
Start: 2017-04-22 | End: 2017-04-25

## 2017-04-22 RX ORDER — TAMSULOSIN HYDROCHLORIDE 0.4 MG/1
0.4 CAPSULE ORAL AT BEDTIME
Qty: 0 | Refills: 0 | Status: DISCONTINUED | OUTPATIENT
Start: 2017-04-22 | End: 2017-04-25

## 2017-04-22 RX ORDER — ENOXAPARIN SODIUM 100 MG/ML
40 INJECTION SUBCUTANEOUS EVERY 24 HOURS
Qty: 0 | Refills: 0 | Status: DISCONTINUED | OUTPATIENT
Start: 2017-04-22 | End: 2017-04-25

## 2017-04-22 RX ORDER — SODIUM CHLORIDE 0.65 %
1 AEROSOL, SPRAY (ML) NASAL
Qty: 0 | Refills: 0 | Status: DISCONTINUED | OUTPATIENT
Start: 2017-04-22 | End: 2017-04-25

## 2017-04-22 RX ADMIN — Medication 150 MILLIGRAM(S): at 12:39

## 2017-04-22 RX ADMIN — Medication 100 MILLIGRAM(S): at 23:13

## 2017-04-22 RX ADMIN — Medication 100 MILLIGRAM(S): at 06:40

## 2017-04-22 RX ADMIN — ENOXAPARIN SODIUM 40 MILLIGRAM(S): 100 INJECTION SUBCUTANEOUS at 16:31

## 2017-04-22 RX ADMIN — FINASTERIDE 5 MILLIGRAM(S): 5 TABLET, FILM COATED ORAL at 12:40

## 2017-04-22 RX ADMIN — TAMSULOSIN HYDROCHLORIDE 0.4 MILLIGRAM(S): 0.4 CAPSULE ORAL at 23:13

## 2017-04-22 RX ADMIN — DULOXETINE HYDROCHLORIDE 60 MILLIGRAM(S): 30 CAPSULE, DELAYED RELEASE ORAL at 12:39

## 2017-04-22 RX ADMIN — Medication 3 MILLIGRAM(S): at 23:13

## 2017-04-22 RX ADMIN — Medication 1 SPRAY(S): at 16:30

## 2017-04-22 RX ADMIN — Medication 1 MILLIGRAM(S): at 13:23

## 2017-04-23 ENCOUNTER — TRANSCRIPTION ENCOUNTER (OUTPATIENT)
Age: 57
End: 2017-04-23

## 2017-04-23 LAB
BASOPHILS # BLD AUTO: 0 K/UL — SIGNIFICANT CHANGE UP (ref 0–0.2)
BASOPHILS NFR BLD AUTO: 0 % — SIGNIFICANT CHANGE UP (ref 0–2)
BUN SERPL-MCNC: 22 MG/DL — SIGNIFICANT CHANGE UP (ref 7–23)
CALCIUM SERPL-MCNC: 8.8 MG/DL — SIGNIFICANT CHANGE UP (ref 8.4–10.5)
CHLORIDE SERPL-SCNC: 97 MMOL/L — LOW (ref 98–107)
CO2 SERPL-SCNC: 25 MMOL/L — SIGNIFICANT CHANGE UP (ref 22–31)
CREAT SERPL-MCNC: 0.92 MG/DL — SIGNIFICANT CHANGE UP (ref 0.5–1.3)
EOSINOPHIL # BLD AUTO: 0.05 K/UL — SIGNIFICANT CHANGE UP (ref 0–0.5)
EOSINOPHIL NFR BLD AUTO: 0.7 % — SIGNIFICANT CHANGE UP (ref 0–6)
GLUCOSE SERPL-MCNC: 203 MG/DL — HIGH (ref 70–99)
HCT VFR BLD CALC: 27.8 % — LOW (ref 39–50)
HGB BLD-MCNC: 8.5 G/DL — LOW (ref 13–17)
IMM GRANULOCYTES NFR BLD AUTO: 0.4 % — SIGNIFICANT CHANGE UP (ref 0–1.5)
LYMPHOCYTES # BLD AUTO: 1.42 K/UL — SIGNIFICANT CHANGE UP (ref 1–3.3)
LYMPHOCYTES # BLD AUTO: 20.5 % — SIGNIFICANT CHANGE UP (ref 13–44)
MAGNESIUM SERPL-MCNC: 1.9 MG/DL — SIGNIFICANT CHANGE UP (ref 1.6–2.6)
MCHC RBC-ENTMCNC: 21.1 PG — LOW (ref 27–34)
MCHC RBC-ENTMCNC: 30.6 % — LOW (ref 32–36)
MCV RBC AUTO: 69.2 FL — LOW (ref 80–100)
MONOCYTES # BLD AUTO: 0.86 K/UL — SIGNIFICANT CHANGE UP (ref 0–0.9)
MONOCYTES NFR BLD AUTO: 12.4 % — SIGNIFICANT CHANGE UP (ref 2–14)
NEUTROPHILS # BLD AUTO: 4.55 K/UL — SIGNIFICANT CHANGE UP (ref 1.8–7.4)
NEUTROPHILS NFR BLD AUTO: 66 % — SIGNIFICANT CHANGE UP (ref 43–77)
PHOSPHATE SERPL-MCNC: 3.2 MG/DL — SIGNIFICANT CHANGE UP (ref 2.5–4.5)
PLATELET # BLD AUTO: 420 K/UL — HIGH (ref 150–400)
PMV BLD: 8.9 FL — SIGNIFICANT CHANGE UP (ref 7–13)
POTASSIUM SERPL-MCNC: 4.1 MMOL/L — SIGNIFICANT CHANGE UP (ref 3.5–5.3)
POTASSIUM SERPL-SCNC: 4.1 MMOL/L — SIGNIFICANT CHANGE UP (ref 3.5–5.3)
RBC # BLD: 4.02 M/UL — LOW (ref 4.2–5.8)
RBC # FLD: 19.3 % — HIGH (ref 10.3–14.5)
SODIUM SERPL-SCNC: 134 MMOL/L — LOW (ref 135–145)
WBC # BLD: 6.91 K/UL — SIGNIFICANT CHANGE UP (ref 3.8–10.5)
WBC # FLD AUTO: 6.91 K/UL — SIGNIFICANT CHANGE UP (ref 3.8–10.5)

## 2017-04-23 PROCEDURE — 99233 SBSQ HOSP IP/OBS HIGH 50: CPT | Mod: GC

## 2017-04-23 RX ORDER — SODIUM CHLORIDE 9 MG/ML
1000 INJECTION INTRAMUSCULAR; INTRAVENOUS; SUBCUTANEOUS ONCE
Qty: 0 | Refills: 0 | Status: DISCONTINUED | OUTPATIENT
Start: 2017-04-23 | End: 2017-04-23

## 2017-04-23 RX ORDER — SODIUM CHLORIDE 9 MG/ML
1000 INJECTION INTRAMUSCULAR; INTRAVENOUS; SUBCUTANEOUS
Qty: 0 | Refills: 0 | Status: DISCONTINUED | OUTPATIENT
Start: 2017-04-23 | End: 2017-04-24

## 2017-04-23 RX ORDER — SODIUM CHLORIDE 9 MG/ML
1000 INJECTION INTRAMUSCULAR; INTRAVENOUS; SUBCUTANEOUS ONCE
Qty: 0 | Refills: 0 | Status: COMPLETED | OUTPATIENT
Start: 2017-04-23 | End: 2017-04-23

## 2017-04-23 RX ADMIN — Medication 1 MILLIGRAM(S): at 11:55

## 2017-04-23 RX ADMIN — DULOXETINE HYDROCHLORIDE 60 MILLIGRAM(S): 30 CAPSULE, DELAYED RELEASE ORAL at 11:55

## 2017-04-23 RX ADMIN — SODIUM CHLORIDE 75 MILLILITER(S): 9 INJECTION INTRAMUSCULAR; INTRAVENOUS; SUBCUTANEOUS at 18:26

## 2017-04-23 RX ADMIN — Medication 150 MILLIGRAM(S): at 11:55

## 2017-04-23 RX ADMIN — SODIUM CHLORIDE 1000 MILLILITER(S): 9 INJECTION INTRAMUSCULAR; INTRAVENOUS; SUBCUTANEOUS at 11:54

## 2017-04-23 RX ADMIN — Medication 100 MILLIGRAM(S): at 21:54

## 2017-04-23 RX ADMIN — TAMSULOSIN HYDROCHLORIDE 0.4 MILLIGRAM(S): 0.4 CAPSULE ORAL at 21:54

## 2017-04-23 RX ADMIN — ENOXAPARIN SODIUM 40 MILLIGRAM(S): 100 INJECTION SUBCUTANEOUS at 16:55

## 2017-04-23 RX ADMIN — FINASTERIDE 5 MILLIGRAM(S): 5 TABLET, FILM COATED ORAL at 11:55

## 2017-04-24 LAB
APTT BLD: 34.9 SEC — SIGNIFICANT CHANGE UP (ref 27.5–37.4)
BASOPHILS # BLD AUTO: 0.01 K/UL — SIGNIFICANT CHANGE UP (ref 0–0.2)
BASOPHILS NFR BLD AUTO: 0.2 % — SIGNIFICANT CHANGE UP (ref 0–2)
BUN SERPL-MCNC: 17 MG/DL — SIGNIFICANT CHANGE UP (ref 7–23)
CALCIUM SERPL-MCNC: 8.8 MG/DL — SIGNIFICANT CHANGE UP (ref 8.4–10.5)
CHLORIDE SERPL-SCNC: 96 MMOL/L — LOW (ref 98–107)
CO2 SERPL-SCNC: 27 MMOL/L — SIGNIFICANT CHANGE UP (ref 22–31)
CREAT SERPL-MCNC: 0.95 MG/DL — SIGNIFICANT CHANGE UP (ref 0.5–1.3)
EOSINOPHIL # BLD AUTO: 0.1 K/UL — SIGNIFICANT CHANGE UP (ref 0–0.5)
EOSINOPHIL NFR BLD AUTO: 2 % — SIGNIFICANT CHANGE UP (ref 0–6)
GLUCOSE SERPL-MCNC: 157 MG/DL — HIGH (ref 70–99)
HBA1C BLD-MCNC: 7.5 % — HIGH (ref 4–5.6)
HCT VFR BLD CALC: 29.7 % — LOW (ref 39–50)
HGB BLD-MCNC: 9 G/DL — LOW (ref 13–17)
IMM GRANULOCYTES NFR BLD AUTO: 1 % — SIGNIFICANT CHANGE UP (ref 0–1.5)
INR BLD: 1.17 — SIGNIFICANT CHANGE UP (ref 0.88–1.17)
LYMPHOCYTES # BLD AUTO: 0.97 K/UL — LOW (ref 1–3.3)
LYMPHOCYTES # BLD AUTO: 19.7 % — SIGNIFICANT CHANGE UP (ref 13–44)
MAGNESIUM SERPL-MCNC: 1.7 MG/DL — SIGNIFICANT CHANGE UP (ref 1.6–2.6)
MCHC RBC-ENTMCNC: 21 PG — LOW (ref 27–34)
MCHC RBC-ENTMCNC: 30.3 % — LOW (ref 32–36)
MCV RBC AUTO: 69.2 FL — LOW (ref 80–100)
MONOCYTES # BLD AUTO: 0.89 K/UL — SIGNIFICANT CHANGE UP (ref 0–0.9)
MONOCYTES NFR BLD AUTO: 18.1 % — HIGH (ref 2–14)
NEUTROPHILS # BLD AUTO: 2.9 K/UL — SIGNIFICANT CHANGE UP (ref 1.8–7.4)
NEUTROPHILS NFR BLD AUTO: 59 % — SIGNIFICANT CHANGE UP (ref 43–77)
PHOSPHATE SERPL-MCNC: 3.2 MG/DL — SIGNIFICANT CHANGE UP (ref 2.5–4.5)
PLATELET # BLD AUTO: 402 K/UL — HIGH (ref 150–400)
PMV BLD: 8.7 FL — SIGNIFICANT CHANGE UP (ref 7–13)
POTASSIUM SERPL-MCNC: 4.1 MMOL/L — SIGNIFICANT CHANGE UP (ref 3.5–5.3)
POTASSIUM SERPL-SCNC: 4.1 MMOL/L — SIGNIFICANT CHANGE UP (ref 3.5–5.3)
PROTHROM AB SERPL-ACNC: 13.1 SEC — SIGNIFICANT CHANGE UP (ref 9.8–13.1)
RBC # BLD: 4.29 M/UL — SIGNIFICANT CHANGE UP (ref 4.2–5.8)
RBC # FLD: 19.4 % — HIGH (ref 10.3–14.5)
SODIUM SERPL-SCNC: 134 MMOL/L — LOW (ref 135–145)
TM INTERPRETATION: SIGNIFICANT CHANGE UP
VISC SER: SIGNIFICANT CHANGE UP (ref 1.4–1.8)
WBC # BLD: 4.92 K/UL — SIGNIFICANT CHANGE UP (ref 3.8–10.5)
WBC # FLD AUTO: 4.92 K/UL — SIGNIFICANT CHANGE UP (ref 3.8–10.5)

## 2017-04-24 PROCEDURE — 99222 1ST HOSP IP/OBS MODERATE 55: CPT

## 2017-04-24 PROCEDURE — 99233 SBSQ HOSP IP/OBS HIGH 50: CPT | Mod: GC

## 2017-04-24 RX ORDER — DEXTROSE 50 % IN WATER 50 %
25 SYRINGE (ML) INTRAVENOUS ONCE
Qty: 0 | Refills: 0 | Status: DISCONTINUED | OUTPATIENT
Start: 2017-04-24 | End: 2017-04-25

## 2017-04-24 RX ORDER — INSULIN LISPRO 100/ML
VIAL (ML) SUBCUTANEOUS
Qty: 0 | Refills: 0 | Status: DISCONTINUED | OUTPATIENT
Start: 2017-04-24 | End: 2017-04-25

## 2017-04-24 RX ORDER — DEXTROSE 50 % IN WATER 50 %
12.5 SYRINGE (ML) INTRAVENOUS ONCE
Qty: 0 | Refills: 0 | Status: DISCONTINUED | OUTPATIENT
Start: 2017-04-24 | End: 2017-04-25

## 2017-04-24 RX ORDER — INSULIN LISPRO 100/ML
VIAL (ML) SUBCUTANEOUS AT BEDTIME
Qty: 0 | Refills: 0 | Status: DISCONTINUED | OUTPATIENT
Start: 2017-04-24 | End: 2017-04-25

## 2017-04-24 RX ORDER — GLUCAGON INJECTION, SOLUTION 0.5 MG/.1ML
1 INJECTION, SOLUTION SUBCUTANEOUS ONCE
Qty: 0 | Refills: 0 | Status: DISCONTINUED | OUTPATIENT
Start: 2017-04-24 | End: 2017-04-25

## 2017-04-24 RX ORDER — SODIUM CHLORIDE 9 MG/ML
1000 INJECTION, SOLUTION INTRAVENOUS
Qty: 0 | Refills: 0 | Status: DISCONTINUED | OUTPATIENT
Start: 2017-04-24 | End: 2017-04-25

## 2017-04-24 RX ORDER — DEXTROSE 50 % IN WATER 50 %
1 SYRINGE (ML) INTRAVENOUS ONCE
Qty: 0 | Refills: 0 | Status: DISCONTINUED | OUTPATIENT
Start: 2017-04-24 | End: 2017-04-25

## 2017-04-24 RX ADMIN — TAMSULOSIN HYDROCHLORIDE 0.4 MILLIGRAM(S): 0.4 CAPSULE ORAL at 21:45

## 2017-04-24 RX ADMIN — Medication 3 MILLIGRAM(S): at 21:48

## 2017-04-24 RX ADMIN — Medication 100 MILLIGRAM(S): at 21:45

## 2017-04-24 RX ADMIN — Medication 1 MILLIGRAM(S): at 12:04

## 2017-04-24 RX ADMIN — ENOXAPARIN SODIUM 40 MILLIGRAM(S): 100 INJECTION SUBCUTANEOUS at 16:34

## 2017-04-24 RX ADMIN — Medication 150 MILLIGRAM(S): at 12:04

## 2017-04-24 RX ADMIN — DULOXETINE HYDROCHLORIDE 60 MILLIGRAM(S): 30 CAPSULE, DELAYED RELEASE ORAL at 12:04

## 2017-04-24 RX ADMIN — FINASTERIDE 5 MILLIGRAM(S): 5 TABLET, FILM COATED ORAL at 12:04

## 2017-04-24 RX ADMIN — Medication 1: at 17:32

## 2017-04-24 RX ADMIN — Medication 3: at 12:51

## 2017-04-25 ENCOUNTER — TRANSCRIPTION ENCOUNTER (OUTPATIENT)
Age: 57
End: 2017-04-25

## 2017-04-25 VITALS
OXYGEN SATURATION: 97 % | SYSTOLIC BLOOD PRESSURE: 113 MMHG | RESPIRATION RATE: 18 BRPM | DIASTOLIC BLOOD PRESSURE: 63 MMHG | HEART RATE: 65 BPM | TEMPERATURE: 98 F

## 2017-04-25 LAB — HEMATOPATHOLOGY REPORT: SIGNIFICANT CHANGE UP

## 2017-04-25 PROCEDURE — 99239 HOSP IP/OBS DSCHRG MGMT >30: CPT

## 2017-04-25 RX ORDER — VENLAFAXINE HCL 75 MG
1 CAPSULE, EXT RELEASE 24 HR ORAL
Qty: 30 | Refills: 0 | OUTPATIENT
Start: 2017-04-25 | End: 2017-05-25

## 2017-04-25 RX ORDER — TAMSULOSIN HYDROCHLORIDE 0.4 MG/1
1 CAPSULE ORAL
Qty: 30 | Refills: 0 | OUTPATIENT
Start: 2017-04-25 | End: 2017-05-25

## 2017-04-25 RX ORDER — VENLAFAXINE HCL 75 MG
1 CAPSULE, EXT RELEASE 24 HR ORAL
Qty: 0 | Refills: 0 | COMMUNITY

## 2017-04-25 RX ORDER — FINASTERIDE 5 MG/1
1 TABLET, FILM COATED ORAL
Qty: 0 | Refills: 0 | COMMUNITY

## 2017-04-25 RX ORDER — TRAZODONE HCL 50 MG
1 TABLET ORAL
Qty: 30 | Refills: 0 | OUTPATIENT
Start: 2017-04-25 | End: 2017-05-25

## 2017-04-25 RX ORDER — METFORMIN HYDROCHLORIDE 850 MG/1
1 TABLET ORAL
Qty: 60 | Refills: 0 | OUTPATIENT
Start: 2017-04-25 | End: 2017-05-25

## 2017-04-25 RX ORDER — FINASTERIDE 5 MG/1
1 TABLET, FILM COATED ORAL
Qty: 30 | Refills: 0 | OUTPATIENT
Start: 2017-04-25 | End: 2017-05-25

## 2017-04-25 RX ORDER — DULOXETINE HYDROCHLORIDE 30 MG/1
1 CAPSULE, DELAYED RELEASE ORAL
Qty: 30 | Refills: 0 | OUTPATIENT
Start: 2017-04-25 | End: 2017-05-25

## 2017-04-25 RX ADMIN — Medication 1 MILLIGRAM(S): at 12:34

## 2017-04-25 RX ADMIN — DULOXETINE HYDROCHLORIDE 60 MILLIGRAM(S): 30 CAPSULE, DELAYED RELEASE ORAL at 12:34

## 2017-04-25 RX ADMIN — Medication 1: at 08:51

## 2017-04-25 RX ADMIN — FINASTERIDE 5 MILLIGRAM(S): 5 TABLET, FILM COATED ORAL at 12:34

## 2017-04-25 RX ADMIN — ENOXAPARIN SODIUM 40 MILLIGRAM(S): 100 INJECTION SUBCUTANEOUS at 18:01

## 2017-04-25 RX ADMIN — Medication 150 MILLIGRAM(S): at 12:34

## 2017-04-25 RX ADMIN — Medication 2: at 12:34

## 2017-04-25 NOTE — DISCHARGE NOTE ADULT - CARE PLAN
Principal Discharge DX:	Lymphoma  Goal:	Diagnosis and treatment  Instructions for follow-up, activity and diet:	Please follow up with the Hematologists at [facility] within 1 week, please call [phone].  Also, please follow up with Neurosurgeon Dr. mEiliano Mcdaniel for staple removal and biopsy incision check.  Secondary Diagnosis:	Diabetes  Instructions for follow-up, activity and diet:	Your Hemoglobin A1c was 7.5%, which diagnostic for Diabetes Mellitus.  Please take Metformin 500mg twice a day.  Please follow up with your primary care physician in 1 week for further management of diabetes.  Please reduce your intake of refined carbohydrates, such as white rice, white bread, sugar, soda, or fruit juice.  Moderate exercise is encouraged, as tolerated by other medical conditions.  Secondary Diagnosis:	Depression  Instructions for follow-up, activity and diet:	Please follow up with your primary care physician and your Psychiatrist.  Secondary Diagnosis:	Benign non-nodular prostatic hyperplasia with lower urinary tract symptoms  Instructions for follow-up, activity and diet:	Please continue your finasteride and tamsulosin. Principal Discharge DX:	Lymphoma  Goal:	Diagnosis and treatment  Instructions for follow-up, activity and diet:	Please follow up with the Hematologists at [facility] within 1 week, please call [phone].  Also, please follow up with Neurosurgeon Dr. Emiliano Mcdaniel for staple removal and biopsy incision check.  Secondary Diagnosis:	Diabetes  Instructions for follow-up, activity and diet:	Your Hemoglobin A1c was 7.5%, which diagnostic for Diabetes Mellitus.  Please take Metformin 500mg twice a day.  Please follow up with your primary care physician in 1 week for further management of diabetes.  Please reduce your intake of refined carbohydrates, such as white rice, white bread, sugar, soda, or fruit juice.  Moderate exercise is encouraged, as tolerated by other medical conditions.  Secondary Diagnosis:	Depression  Instructions for follow-up, activity and diet:	Please follow up with your primary care physician and your Psychiatrist.  Secondary Diagnosis:	Benign non-nodular prostatic hyperplasia with lower urinary tract symptoms  Instructions for follow-up, activity and diet:	Please continue your finasteride and tamsulosin. Principal Discharge DX:	Lymphoma  Goal:	Diagnosis and treatment  Instructions for follow-up, activity and diet:	Please follow up with the Hematologists at [facility] within 1 week, please call [phone].  Also, please follow up with Neurosurgeon Dr. Emiliano Mcdaniel for staple removal and biopsy incision check.  Secondary Diagnosis:	Diabetes  Instructions for follow-up, activity and diet:	Your Hemoglobin A1c was 7.5%, which diagnostic for Diabetes Mellitus.  Please take Metformin 500mg twice a day.  Please follow up with your primary care physician Dr. Christina Mcleod Tomorrow, Wednesday, at 4pm.  Please reduce your intake of refined carbohydrates, such as white rice, white bread, sugar, soda, or fruit juice.  Moderate exercise is encouraged, as tolerated by other medical conditions.  Secondary Diagnosis:	Depression  Instructions for follow-up, activity and diet:	Please follow up with your primary care physician and your Psychiatrist.  Secondary Diagnosis:	Benign non-nodular prostatic hyperplasia with lower urinary tract symptoms  Instructions for follow-up, activity and diet:	Please continue your finasteride and tamsulosin.  Pleas follow up with your primary care physician. Principal Discharge DX:	Lymphoma  Goal:	Diagnosis and treatment  Instructions for follow-up, activity and diet:	Please follow up with the Hematologists at [facility] within 1 week, please call [phone].  Also, please follow up with Neurosurgeon Dr. Emiliano Mcdaniel for staple removal and biopsy incision check.  Secondary Diagnosis:	Diabetes  Instructions for follow-up, activity and diet:	Your Hemoglobin A1c was 7.5%, which diagnostic for Diabetes Mellitus.  Please take Metformin 500mg twice a day.  Please follow up with your primary care physician Dr. Christina Mcleod Tomorrow, Wednesday, at 4pm.  Please reduce your intake of refined carbohydrates, such as white rice, white bread, sugar, soda, or fruit juice.  Moderate exercise is encouraged, as tolerated by other medical conditions.  Secondary Diagnosis:	Depression  Instructions for follow-up, activity and diet:	Please follow up with your primary care physician and your Psychiatrist.  Secondary Diagnosis:	Benign non-nodular prostatic hyperplasia with lower urinary tract symptoms  Instructions for follow-up, activity and diet:	Please continue your finasteride and tamsulosin.  Pleas follow up with your primary care physician. Principal Discharge DX:	Lymphoma  Goal:	Diagnosis and treatment  Instructions for follow-up, activity and diet:	Please follow up with the Hematologists at [facility] within 1 week, please call [phone].  Also, please follow up with Neurosurgeon Dr. Emiliano Mcdaniel for staple removal and biopsy incision check.  Secondary Diagnosis:	Diabetes  Instructions for follow-up, activity and diet:	Your Hemoglobin A1c was 7.5%, which diagnostic for Diabetes Mellitus.  Please take Metformin 500mg twice a day.  Please follow up with your primary care physician Dr. Christina Mcleod Tomorrow, Wednesday, at 4pm.  Please reduce your intake of refined carbohydrates, such as white rice, white bread, sugar, soda, or fruit juice.  Moderate exercise is encouraged, as tolerated by other medical conditions.  Secondary Diagnosis:	Depression  Instructions for follow-up, activity and diet:	Please follow up with your primary care physician and your Psychiatrist.  Secondary Diagnosis:	Benign non-nodular prostatic hyperplasia with lower urinary tract symptoms  Instructions for follow-up, activity and diet:	Please continue your finasteride and tamsulosin.  Pleas follow up with your primary care physician. Principal Discharge DX:	Lymphoma  Goal:	Diagnosis and treatment  Instructions for follow-up, activity and diet:	Please follow up with the Hematologists at [facility] within 1 week, please call [phone].  Also, please follow up with Neurosurgeon Dr. Emiliano Mcdaniel for staple removal and biopsy incision check.  Secondary Diagnosis:	Diabetes  Instructions for follow-up, activity and diet:	Your Hemoglobin A1c was 7.5%, which diagnostic for Diabetes Mellitus.  Please take Metformin 500mg twice a day.  Please follow up with your primary care physician Dr. Christina Mcleod Tomorrow, Wednesday, at 4pm.  Please reduce your intake of refined carbohydrates, such as white rice, white bread, sugar, soda, or fruit juice.  Moderate exercise is encouraged, as tolerated by other medical conditions.  Secondary Diagnosis:	Depression  Instructions for follow-up, activity and diet:	Please follow up with your primary care physician and your Psychiatrist.  Secondary Diagnosis:	Benign non-nodular prostatic hyperplasia with lower urinary tract symptoms  Instructions for follow-up, activity and diet:	Please continue your finasteride and tamsulosin.  Pleas follow up with your primary care physician. Principal Discharge DX:	Lymphoma  Goal:	Diagnosis and treatment  Instructions for follow-up, activity and diet:	Please follow up with the Hematologists at [facility] within 1 week, please call [phone].  Also, please follow up with Neurosurgeon Dr. Emiliano Mcdaniel for staple removal and biopsy incision check.  Secondary Diagnosis:	Diabetes  Instructions for follow-up, activity and diet:	Your Hemoglobin A1c was 7.5%, which diagnostic for Diabetes Mellitus.  Please take Metformin 500mg twice a day.  Please follow up with your primary care physician Dr. Christina Mcleod Tomorrow, Wednesday, at 4pm.  Please reduce your intake of refined carbohydrates, such as white rice, white bread, sugar, soda, or fruit juice.  Moderate exercise is encouraged, as tolerated by other medical conditions.  Secondary Diagnosis:	Depression  Instructions for follow-up, activity and diet:	Please follow up with your primary care physician and your Psychiatrist.  Secondary Diagnosis:	Benign non-nodular prostatic hyperplasia with lower urinary tract symptoms  Instructions for follow-up, activity and diet:	Please continue your finasteride and tamsulosin.  Pleas follow up with your primary care physician.

## 2017-04-25 NOTE — DISCHARGE NOTE ADULT - CARE PROVIDERS DIRECT ADDRESSES
,DirectAddress_Unknown,DirectAddress_Unknown,DirectAddress_Unknown ,DirectAddress_Unknown,[email or web address],DirectAddress_Unknown

## 2017-04-25 NOTE — DISCHARGE NOTE ADULT - PATIENT PORTAL LINK FT
“You can access the FollowHealth Patient Portal, offered by [facility], by registering with the following website: [email or web address]”

## 2017-04-25 NOTE — DISCHARGE NOTE ADULT - HOSPITAL COURSE
57M PMH BPH, major depressive disorder, and recent hospital admission 4/12 for diagnosis of lymphoma with brain mets now presents with recurrent falls. Patient reports that since he left the hospital he has fallen 3 times in the past 2 days. Was at PMD today for follow up and told to go to the ED. Reports feeling that his legs are weak and give way causing him to fall. Says that most recently he was smoking a cigarette outside of his house and felt suddenly unstable on his feet, tried to maintain his balance, but could not and fell. Denies any head trauma or LOC. Also reports feeling sometimes dizzy. No CP, palpitations, HA, vision changes. During one of the falls while walking on street, scraped his right hand and has pain on right side lower back. Reports eating and drinking well. Thinks fall are from being in the hospital recently and feeling weak and also from lack of sleep in hospital. Denies any N/V, D/C, abd pain, fevers/chills, no focal neuro motor or sensory deficits.    During most recent hospitalization patient presented with HA and found to have new brain lesion. Also found with diffuse lymphadenopathy. 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 Patient was to follow up with [facility] and [facility] outpatient.    In ED: 98F, 96, 103/62, 16, 96%RA. He was admitted for evaluation of gait instability secondary to known brain lesion vs deconditioning from recent hospitalization. In the unit, he was observed ambulating without difficulty, and therefore a PT evaluation was not indicated. His lymph node biopsy from a prior hospitalization demonstrated CD5- and CD10- lymphoma, suggestive of a low-grade lesion, and therefore the brain lesion was concerning for a second primary. A bone marrow biopsy was performed on HD#3 that showed B cell lymphoma. To evaluate the brain lesion, neurosurgery performed an open biopsy on HD#6, and he tolerated the procedure well. On HD#8, he experienced circumferential neck pain, mild swelling and odynophagia but after evaluation with neurosurgery, the risk of a CT neck did not outweigh the likelihood of a positive result. 57M PMH BPH, major depressive disorder, and recent hospital admission 4/12 for diagnosis of lymphoma with brain mets now presents with recurrent falls. Patient reports that since he left the hospital he has fallen 3 times in the past 2 days. Was at PMD today for follow up and told to go to the ED. Reports feeling that his legs are weak and give way causing him to fall. Says that most recently he was smoking a cigarette outside of his house and felt suddenly unstable on his feet, tried to maintain his balance, but could not and fell. Denies any head trauma or LOC. Also reports feeling sometimes dizzy. No CP, palpitations, HA, vision changes. During one of the falls while walking on street, scraped his right hand and has pain on right side lower back. Reports eating and drinking well. Thinks fall are from being in the hospital recently and feeling weak and also from lack of sleep in hospital. Denies any N/V, D/C, abd pain, fevers/chills, no focal neuro motor or sensory deficits.    During most recent hospitalization patient presented with HA and found to have new brain lesion. Also found with diffuse lymphadenopathy. 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 Patient was to follow up with [facility] and [facility] outpatient.    In ED: 98F, 96, 103/62, 16, 96%RA. He was admitted for evaluation of gait instability secondary to known brain lesion vs deconditioning from recent hospitalization. In the unit, he was observed ambulating without difficulty, and therefore a PT evaluation was not indicated. His lymph node biopsy from a prior hospitalization demonstrated CD5- and CD10- lymphoma, suggestive of a low-grade lesion, and therefore the brain lesion was concerning for a second primary. A bone marrow biopsy was performed on HD#3 that showed B cell lymphoma. To evaluate the brain lesion, neurosurgery performed an open biopsy on HD#6, and he tolerated the procedure well. On HD#8, he experienced circumferential neck pain, mild swelling and odynophagia but after evaluation with neurosurgery, the risk of a CT neck did not outweigh the likelihood of a positive result.  His blood glucose levels were high on daily CMP, and a Hgb A1c was 7.5%, new diagnosis, and patient was discharge on metformin. 57M PMH BPH, major depressive disorder, and recent hospital admission 4/12 for diagnosis of lymphoma with brain mets now presents with recurrent falls. Patient reports that since he left the hospital he has fallen 3 times in the past 2 days. Was at PMD today for follow up and told to go to the ED. Reports feeling that his legs are weak and give way causing him to fall. Says that most recently he was smoking a cigarette outside of his house and felt suddenly unstable on his feet, tried to maintain his balance, but could not and fell. Denies any head trauma or LOC. Also reports feeling sometimes dizzy. No CP, palpitations, HA, vision changes. During one of the falls while walking on street, scraped his right hand and has pain on right side lower back. Reports eating and drinking well. Thinks fall are from being in the hospital recently and feeling weak and also from lack of sleep in hospital. Denies any N/V, D/C, abd pain, fevers/chills, no focal neuro motor or sensory deficits.    During most recent hospitalization patient presented with HA and found to have new brain lesion. Also found with diffuse lymphadenopathy. 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 Patient was to follow up with [facility] and [facility] outpatient.    In ED: 98F, 96, 103/62, 16, 96%RA. He was admitted for evaluation of gait instability secondary to known brain lesion vs deconditioning from recent hospitalization. In the unit, he was observed ambulating without difficulty, and therefore a PT evaluation was not indicated. His lymph node biopsy from a prior hospitalization demonstrated CD5- and CD10- lymphoma, suggestive of a low-grade lesion, and therefore the brain lesion was concerning for a second primary. A bone marrow biopsy was performed on HD#3 that showed B cell lymphoma. To evaluate the brain lesion, neurosurgery performed an open biopsy on HD#6, and he tolerated the procedure well. On HD#8, he experienced circumferential neck pain, mild swelling and odynophagia but after evaluation with neurosurgery, the risk of a CT neck did not outweigh the likelihood of a positive result.  His blood glucose levels were high on daily CMP, and a Hgb A1c was 7.5%, new diagnosis, and patient was discharged on metformin, and with an appointment with his PCP for the next day.  He will follow up the results of the surgical pathology of the scalp with the Hematologists at [facility] within 1 week. 57M PMH BPH, major depressive disorder, and recent hospital admission 4/12 for diagnosis of lymphoma with brain mets now presents with recurrent falls. Patient reports that since he left the hospital he has fallen 3 times in the past 2 days. Was at PMD today for follow up and told to go to the ED. Reports feeling that his legs are weak and give way causing him to fall. Says that most recently he was smoking a cigarette outside of his house and felt suddenly unstable on his feet, tried to maintain his balance, but could not and fell. Denies any head trauma or LOC. Also reports feeling sometimes dizzy. No CP, palpitations, HA, vision changes. During one of the falls while walking on street, scraped his right hand and has pain on right side lower back. Reports eating and drinking well. Thinks fall are from being in the hospital recently and feeling weak and also from lack of sleep in hospital. Denies any N/V, D/C, abd pain, fevers/chills, no focal neuro motor or sensory deficits.    During most recent hospitalization patient presented with HA and found to have new brain lesion. Also found with diffuse lymphadenopathy. 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 Patient was to follow up with [facility] and [facility] outpatient.    In ED: 98F, 96, 103/62, 16, 96%RA. He was admitted for evaluation of gait instability secondary to known brain lesion vs deconditioning from recent hospitalization. In the unit, he was observed ambulating without difficulty, and therefore a PT evaluation was not indicated. His lymph node biopsy from a prior hospitalization demonstrated CD5- and CD10- lymphoma, suggestive of a low-grade lesion, and therefore the brain lesion was concerning for a second primary. A bone marrow biopsy was performed on HD#3 that showed B cell lymphoma. To evaluate the brain lesion, neurosurgery performed an open biopsy on HD#6, and he tolerated the procedure well. On HD#8, he experienced circumferential neck pain, and odynophagia exam was benign and his symptoms ultimately resolved without intervention.  His blood glucose levels were high on daily CMP, and a Hgb A1c was 7.5%, new diagnosis, and patient was discharged on metformin, and with an appointment with his PCP for the next day.  He will follow up the results of the surgical pathology of the scalp with the Hematologists at [facility] within 1 week.

## 2017-04-25 NOTE — DISCHARGE NOTE ADULT - MEDICATION SUMMARY - MEDICATIONS TO TAKE
I will START or STAY ON the medications listed below when I get home from the hospital:    finasteride 5 mg oral tablet  -- 1 tab(s) by mouth once a day  -- Indication: For Benign non-nodular prostatic hyperplasia with lower urinary tract symptoms    tamsulosin 0.4 mg oral capsule  -- 1 cap(s) by mouth once a day (at bedtime)  -- Indication: For Benign non-nodular prostatic hyperplasia with lower urinary tract symptoms    venlafaxine 150 mg oral capsule, extended release  -- 1 cap(s) by mouth once a day  -- Indication: For Depression    DULoxetine 60 mg oral delayed release capsule  -- 1 cap(s) by mouth once a day  -- Check with your doctor before becoming pregnant.  Obtain medical advice before taking any non-prescription drugs as some may affect the action of this medication.  Swallow whole.  Do not crush.  This drug may impair the ability to drive or operate machinery.  Use care until you become familiar with its effects.    -- Indication: For Depression    traZODone 100 mg oral tablet  -- 1 tab(s) by mouth once a day (at bedtime)  -- Indication: For Depression    metFORMIN 500 mg oral tablet  -- 1 tab(s) by mouth 2 times a day  -- Check with your doctor before becoming pregnant.  Do not drink alcoholic beverages when taking this medication.  It is very important that you take or use this exactly as directed.  Do not skip doses or discontinue unless directed by your doctor.  Obtain medical advice before taking any non-prescription drugs as some may affect the action of this medication.  Take with food or milk.    -- Indication: For Diabetes

## 2017-04-25 NOTE — DISCHARGE NOTE ADULT - CARE PROVIDER_API CALL
Christina Mcleod), Internal Medicine  [address] [place] [address]  Phone: [phone]  Fax: [phone]    Traci Mcdaniel), Pediatrics  [address] [address]  Phone: [phone]  Fax: [phone] Christina Mcleod), Internal Medicine  [address] [place] [address]  Phone: [phone]  Fax: [phone]    Emiliano Mcdaniel), Neurological Surgery  [address] [place] [address]  Phone: [phone]  Fax: [phone]

## 2017-04-25 NOTE — DISCHARGE NOTE ADULT - PLAN OF CARE
Diagnosis and treatment Please follow up with the Hematologists at [facility] within 1 week, please call [phone].  Also, please follow up with Neurosurgeon Dr. Emiliano Mcdaniel for staple removal and biopsy incision check. Your Hemoglobin A1c was 7.5%, which diagnostic for Diabetes Mellitus.  Please take Metformin 500mg twice a day.  Please follow up with your primary care physician in 1 week for further management of diabetes.  Please reduce your intake of refined carbohydrates, such as white rice, white bread, sugar, soda, or fruit juice.  Moderate exercise is encouraged, as tolerated by other medical conditions. Please follow up with your primary care physician and your Psychiatrist. Please continue your finasteride and tamsulosin. Your Hemoglobin A1c was 7.5%, which diagnostic for Diabetes Mellitus.  Please take Metformin 500mg twice a day.  Please follow up with your primary care physician Dr. Christina Mcleod Tomorrow, Wednesday, at 4pm.  Please reduce your intake of refined carbohydrates, such as white rice, white bread, sugar, soda, or fruit juice.  Moderate exercise is encouraged, as tolerated by other medical conditions. Please continue your finasteride and tamsulosin.  Pleas follow up with your primary care physician.

## 2017-04-25 NOTE — DISCHARGE NOTE ADULT - MEDICATION SUMMARY - MEDICATIONS TO STOP TAKING
I will STOP taking the medications listed below when I get home from the hospital:    ALPRAZolam 1 mg oral tablet  -- 1 tab(s) by mouth every 12 hours, As needed, Anxiety MDD:MDD two tablets daily

## 2017-04-27 LAB — GAS PNL BLDMV: SIGNIFICANT CHANGE UP

## 2017-04-28 ENCOUNTER — APPOINTMENT (OUTPATIENT)
Dept: UROLOGY | Facility: CLINIC | Age: 57
End: 2017-04-28

## 2017-04-28 VITALS
WEIGHT: 185 LBS | HEIGHT: 71 IN | TEMPERATURE: 98 F | BODY MASS INDEX: 25.9 KG/M2 | SYSTOLIC BLOOD PRESSURE: 138 MMHG | HEART RATE: 118 BPM | DIASTOLIC BLOOD PRESSURE: 72 MMHG | RESPIRATION RATE: 17 BRPM

## 2017-04-28 DIAGNOSIS — R35.0 FREQUENCY OF MICTURITION: ICD-10-CM

## 2017-04-28 LAB — HEMATOPATHOLOGY REPORT: SIGNIFICANT CHANGE UP

## 2017-05-04 ENCOUNTER — APPOINTMENT (OUTPATIENT)
Dept: HEMATOLOGY ONCOLOGY | Facility: CLINIC | Age: 57
End: 2017-05-04

## 2017-05-04 VITALS
WEIGHT: 187.39 LBS | TEMPERATURE: 98 F | RESPIRATION RATE: 16 BRPM | HEART RATE: 108 BPM | BODY MASS INDEX: 26.23 KG/M2 | HEIGHT: 70.87 IN | SYSTOLIC BLOOD PRESSURE: 130 MMHG | OXYGEN SATURATION: 99 % | DIASTOLIC BLOOD PRESSURE: 70 MMHG

## 2017-05-04 DIAGNOSIS — F17.200 NICOTINE DEPENDENCE, UNSPECIFIED, UNCOMPLICATED: ICD-10-CM

## 2017-05-04 DIAGNOSIS — D64.9 ANEMIA, UNSPECIFIED: ICD-10-CM

## 2017-05-05 ENCOUNTER — APPOINTMENT (OUTPATIENT)
Dept: UROLOGY | Facility: CLINIC | Age: 57
End: 2017-05-05

## 2017-05-05 DIAGNOSIS — C88.0 WALDENSTROM MACROGLOBULINEMIA: ICD-10-CM

## 2017-05-06 PROBLEM — F17.200 CURRENT EVERY DAY SMOKER: Status: ACTIVE | Noted: 2017-05-06

## 2017-05-06 PROBLEM — D64.9 ANEMIA: Status: ACTIVE | Noted: 2017-05-06

## 2017-05-08 DIAGNOSIS — N40.1 BENIGN PROSTATIC HYPERPLASIA WITH LOWER URINARY TRACT SYMPMS: ICD-10-CM

## 2017-05-08 DIAGNOSIS — R35.1 BENIGN PROSTATIC HYPERPLASIA WITH LOWER URINARY TRACT SYMPMS: ICD-10-CM

## 2017-05-11 ENCOUNTER — RESULT REVIEW (OUTPATIENT)
Age: 57
End: 2017-05-11

## 2017-05-11 ENCOUNTER — APPOINTMENT (OUTPATIENT)
Dept: HEMATOLOGY ONCOLOGY | Facility: CLINIC | Age: 57
End: 2017-05-11

## 2017-05-11 VITALS
OXYGEN SATURATION: 99 % | DIASTOLIC BLOOD PRESSURE: 85 MMHG | RESPIRATION RATE: 16 BRPM | BODY MASS INDEX: 25.62 KG/M2 | TEMPERATURE: 97.6 F | HEART RATE: 114 BPM | SYSTOLIC BLOOD PRESSURE: 132 MMHG | WEIGHT: 182.98 LBS

## 2017-05-11 LAB
EOSINOPHIL # BLD AUTO: 0 K/UL — SIGNIFICANT CHANGE UP (ref 0–0.5)
EOSINOPHIL NFR BLD AUTO: 2 % — SIGNIFICANT CHANGE UP (ref 0–6)
HCT VFR BLD CALC: 31.3 % — LOW (ref 39–50)
HGB BLD-MCNC: 10.1 G/DL — LOW (ref 13–17)
LYMPHOCYTES # BLD AUTO: 1.1 K/UL — SIGNIFICANT CHANGE UP (ref 1–3.3)
LYMPHOCYTES # BLD AUTO: 32 % — SIGNIFICANT CHANGE UP (ref 13–44)
MCHC RBC-ENTMCNC: 22.4 PG — LOW (ref 27–34)
MCHC RBC-ENTMCNC: 32.2 G/DL — SIGNIFICANT CHANGE UP (ref 32–36)
MCV RBC AUTO: 69.5 FL — LOW (ref 80–100)
MONOCYTES # BLD AUTO: 0.9 K/UL — SIGNIFICANT CHANGE UP (ref 0–0.9)
MONOCYTES NFR BLD AUTO: 11 % — SIGNIFICANT CHANGE UP (ref 2–14)
NEUTROPHILS # BLD AUTO: 2.4 K/UL — SIGNIFICANT CHANGE UP (ref 1.8–7.4)
NEUTROPHILS NFR BLD AUTO: 55 % — SIGNIFICANT CHANGE UP (ref 43–77)
PLAT MORPH BLD: NORMAL — SIGNIFICANT CHANGE UP
PLATELET # BLD AUTO: 466 K/UL — HIGH (ref 150–400)
RBC # BLD: 4.51 M/UL — SIGNIFICANT CHANGE UP (ref 4.2–5.8)
RBC # FLD: 17.7 % — HIGH (ref 10.3–14.5)
RBC BLD AUTO: SIGNIFICANT CHANGE UP
WBC # BLD: 4.4 K/UL — SIGNIFICANT CHANGE UP (ref 3.8–10.5)
WBC # FLD AUTO: 4.4 K/UL — SIGNIFICANT CHANGE UP (ref 3.8–10.5)

## 2017-05-11 PROCEDURE — 93010 ELECTROCARDIOGRAM REPORT: CPT

## 2017-05-12 LAB
ALBUMIN SERPL ELPH-MCNC: 3.8 G/DL — SIGNIFICANT CHANGE UP (ref 3.3–5)
ALP SERPL-CCNC: 199 U/L — HIGH (ref 40–120)
ALT FLD-CCNC: 14 U/L — SIGNIFICANT CHANGE UP (ref 10–45)
ANION GAP SERPL CALC-SCNC: 19 MMOL/L — HIGH (ref 5–17)
AST SERPL-CCNC: 16 U/L — SIGNIFICANT CHANGE UP (ref 10–40)
BILIRUB SERPL-MCNC: 0.5 MG/DL — SIGNIFICANT CHANGE UP (ref 0.2–1.2)
BUN SERPL-MCNC: 12 MG/DL — SIGNIFICANT CHANGE UP (ref 7–23)
CALCIUM SERPL-MCNC: 9.4 MG/DL — SIGNIFICANT CHANGE UP (ref 8.4–10.5)
CHLORIDE SERPL-SCNC: 100 MMOL/L — SIGNIFICANT CHANGE UP (ref 96–108)
CO2 SERPL-SCNC: 22 MMOL/L — SIGNIFICANT CHANGE UP (ref 22–31)
CREAT SERPL-MCNC: 0.96 MG/DL — SIGNIFICANT CHANGE UP (ref 0.5–1.3)
GLUCOSE SERPL-MCNC: 113 MG/DL — HIGH (ref 70–99)
LDH SERPL L TO P-CCNC: 123 U/L — SIGNIFICANT CHANGE UP (ref 50–242)
POTASSIUM SERPL-MCNC: 4.8 MMOL/L — SIGNIFICANT CHANGE UP (ref 3.5–5.3)
POTASSIUM SERPL-SCNC: 4.8 MMOL/L — SIGNIFICANT CHANGE UP (ref 3.5–5.3)
PROT SERPL-MCNC: 9.9 G/DL — HIGH (ref 6–8.3)
SODIUM SERPL-SCNC: 141 MMOL/L — SIGNIFICANT CHANGE UP (ref 135–145)
URATE SERPL-MCNC: 4.5 MG/DL — SIGNIFICANT CHANGE UP (ref 3.4–8.8)

## 2017-05-16 ENCOUNTER — OUTPATIENT (OUTPATIENT)
Dept: OUTPATIENT SERVICES | Facility: HOSPITAL | Age: 57
LOS: 1 days | Discharge: HOME | End: 2017-05-16

## 2017-05-16 DIAGNOSIS — J34.2 DEVIATED NASAL SEPTUM: Chronic | ICD-10-CM

## 2017-05-16 DIAGNOSIS — C85.88 OTHER SPECIFIED TYPES OF NON-HODGKIN LYMPHOMA, LYMPH NODES OF MULTIPLE SITES: ICD-10-CM

## 2017-05-18 ENCOUNTER — RESULT REVIEW (OUTPATIENT)
Age: 57
End: 2017-05-18

## 2017-05-18 ENCOUNTER — APPOINTMENT (OUTPATIENT)
Dept: HEMATOLOGY ONCOLOGY | Facility: CLINIC | Age: 57
End: 2017-05-18

## 2017-05-18 LAB
ALBUMIN SERPL ELPH-MCNC: 3.8 G/DL — SIGNIFICANT CHANGE UP (ref 3.3–5)
ALP SERPL-CCNC: 140 U/L — HIGH (ref 40–120)
ALT FLD-CCNC: 13 U/L — SIGNIFICANT CHANGE UP (ref 10–45)
ANION GAP SERPL CALC-SCNC: 20 MMOL/L — HIGH (ref 5–17)
AST SERPL-CCNC: 13 U/L — SIGNIFICANT CHANGE UP (ref 10–40)
BILIRUB SERPL-MCNC: 0.5 MG/DL — SIGNIFICANT CHANGE UP (ref 0.2–1.2)
BUN SERPL-MCNC: 14 MG/DL — SIGNIFICANT CHANGE UP (ref 7–23)
CALCIUM SERPL-MCNC: 9.7 MG/DL — SIGNIFICANT CHANGE UP (ref 8.4–10.5)
CHLORIDE SERPL-SCNC: 100 MMOL/L — SIGNIFICANT CHANGE UP (ref 96–108)
CO2 SERPL-SCNC: 19 MMOL/L — LOW (ref 22–31)
CREAT SERPL-MCNC: 1.02 MG/DL — SIGNIFICANT CHANGE UP (ref 0.5–1.3)
GLUCOSE SERPL-MCNC: 165 MG/DL — HIGH (ref 70–99)
HCT VFR BLD CALC: 34.8 % — LOW (ref 39–50)
HGB BLD-MCNC: 10.9 G/DL — LOW (ref 13–17)
MCHC RBC-ENTMCNC: 22.2 PG — LOW (ref 27–34)
MCHC RBC-ENTMCNC: 31.4 G/DL — LOW (ref 32–36)
MCV RBC AUTO: 70.6 FL — LOW (ref 80–100)
PLATELET # BLD AUTO: 338 K/UL — SIGNIFICANT CHANGE UP (ref 150–400)
POTASSIUM SERPL-MCNC: 3.8 MMOL/L — SIGNIFICANT CHANGE UP (ref 3.5–5.3)
POTASSIUM SERPL-SCNC: 3.8 MMOL/L — SIGNIFICANT CHANGE UP (ref 3.5–5.3)
PROT SERPL-MCNC: 9 G/DL — HIGH (ref 6–8.3)
RBC # BLD: 4.93 M/UL — SIGNIFICANT CHANGE UP (ref 4.2–5.8)
RBC # FLD: 18.8 % — HIGH (ref 10.3–14.5)
SODIUM SERPL-SCNC: 139 MMOL/L — SIGNIFICANT CHANGE UP (ref 135–145)
WBC # BLD: 4.5 K/UL — SIGNIFICANT CHANGE UP (ref 3.8–10.5)
WBC # FLD AUTO: 4.5 K/UL — SIGNIFICANT CHANGE UP (ref 3.8–10.5)

## 2017-05-22 ENCOUNTER — RESULT REVIEW (OUTPATIENT)
Age: 57
End: 2017-05-22

## 2017-05-22 ENCOUNTER — APPOINTMENT (OUTPATIENT)
Dept: HEMATOLOGY ONCOLOGY | Facility: CLINIC | Age: 57
End: 2017-05-22

## 2017-05-22 VITALS
HEART RATE: 114 BPM | RESPIRATION RATE: 16 BRPM | OXYGEN SATURATION: 98 % | WEIGHT: 184.53 LBS | DIASTOLIC BLOOD PRESSURE: 86 MMHG | BODY MASS INDEX: 25.83 KG/M2 | SYSTOLIC BLOOD PRESSURE: 136 MMHG | TEMPERATURE: 97.9 F

## 2017-05-22 LAB
ANISOCYTOSIS BLD QL: SLIGHT — SIGNIFICANT CHANGE UP
B2 MICROGLOB SERPL-MCNC: 3.3 MG/L — HIGH (ref 0.8–2.2)
BASO STIPL BLD QL SMEAR: PRESENT — SIGNIFICANT CHANGE UP
DACRYOCYTES BLD QL SMEAR: SLIGHT — SIGNIFICANT CHANGE UP
ELLIPTOCYTES BLD QL SMEAR: SLIGHT — SIGNIFICANT CHANGE UP
EOSINOPHIL # BLD AUTO: 0.1 K/UL — SIGNIFICANT CHANGE UP (ref 0–0.5)
EOSINOPHIL NFR BLD AUTO: 1 % — SIGNIFICANT CHANGE UP (ref 0–6)
HCT VFR BLD CALC: 38.2 % — LOW (ref 39–50)
HGB BLD-MCNC: 12 G/DL — LOW (ref 13–17)
LG PLATELETS BLD QL AUTO: SLIGHT — SIGNIFICANT CHANGE UP
LYMPHOCYTES # BLD AUTO: 1.4 K/UL — SIGNIFICANT CHANGE UP (ref 1–3.3)
LYMPHOCYTES # BLD AUTO: 47 % — HIGH (ref 13–44)
MACROCYTES BLD QL: SLIGHT — SIGNIFICANT CHANGE UP
MCHC RBC-ENTMCNC: 22.7 PG — LOW (ref 27–34)
MCHC RBC-ENTMCNC: 31.3 G/DL — LOW (ref 32–36)
MCV RBC AUTO: 72.5 FL — LOW (ref 80–100)
MICROCYTES BLD QL: SLIGHT — SIGNIFICANT CHANGE UP
MONOCYTES # BLD AUTO: 0.9 K/UL — SIGNIFICANT CHANGE UP (ref 0–0.9)
MONOCYTES NFR BLD AUTO: 17 % — HIGH (ref 2–14)
NEUTROPHILS # BLD AUTO: 1.3 K/UL — LOW (ref 1.8–7.4)
NEUTROPHILS NFR BLD AUTO: 35 % — LOW (ref 43–77)
PLAT MORPH BLD: NORMAL — SIGNIFICANT CHANGE UP
PLATELET # BLD AUTO: 281 K/UL — SIGNIFICANT CHANGE UP (ref 150–400)
POIKILOCYTOSIS BLD QL AUTO: SLIGHT — SIGNIFICANT CHANGE UP
RBC # BLD: 5.27 M/UL — SIGNIFICANT CHANGE UP (ref 4.2–5.8)
RBC # FLD: 19.5 % — HIGH (ref 10.3–14.5)
RBC BLD AUTO: ABNORMAL
VISC BLD: 2.2 RATIO — HIGH (ref 1.4–1.8)
WBC # BLD: 3.7 K/UL — LOW (ref 3.8–10.5)
WBC # FLD AUTO: 3.7 K/UL — LOW (ref 3.8–10.5)

## 2017-05-22 RX ORDER — ALPRAZOLAM 0.5 MG/1
0.5 TABLET ORAL
Refills: 0 | Status: ACTIVE | COMMUNITY
Start: 2017-05-22

## 2017-05-23 LAB
ALBUMIN SERPL ELPH-MCNC: 4 G/DL — SIGNIFICANT CHANGE UP (ref 3.3–5)
ALP SERPL-CCNC: 160 U/L — HIGH (ref 40–120)
ALT FLD-CCNC: 15 U/L — SIGNIFICANT CHANGE UP (ref 10–45)
ANION GAP SERPL CALC-SCNC: 19 MMOL/L — HIGH (ref 5–17)
AST SERPL-CCNC: 10 U/L — SIGNIFICANT CHANGE UP (ref 10–40)
BILIRUB SERPL-MCNC: 0.6 MG/DL — SIGNIFICANT CHANGE UP (ref 0.2–1.2)
BUN SERPL-MCNC: 14 MG/DL — SIGNIFICANT CHANGE UP (ref 7–23)
CALCIUM SERPL-MCNC: 10.2 MG/DL — SIGNIFICANT CHANGE UP (ref 8.4–10.5)
CHLORIDE SERPL-SCNC: 99 MMOL/L — SIGNIFICANT CHANGE UP (ref 96–108)
CO2 SERPL-SCNC: 23 MMOL/L — SIGNIFICANT CHANGE UP (ref 22–31)
CREAT SERPL-MCNC: 1.04 MG/DL — SIGNIFICANT CHANGE UP (ref 0.5–1.3)
GLUCOSE SERPL-MCNC: 96 MG/DL — SIGNIFICANT CHANGE UP (ref 70–99)
LDH SERPL L TO P-CCNC: 99 U/L — SIGNIFICANT CHANGE UP (ref 50–242)
POTASSIUM SERPL-MCNC: 4.1 MMOL/L — SIGNIFICANT CHANGE UP (ref 3.5–5.3)
POTASSIUM SERPL-SCNC: 4.1 MMOL/L — SIGNIFICANT CHANGE UP (ref 3.5–5.3)
PROT SERPL-MCNC: 9.3 G/DL — HIGH (ref 6–8.3)
SODIUM SERPL-SCNC: 141 MMOL/L — SIGNIFICANT CHANGE UP (ref 135–145)
URATE SERPL-MCNC: 5.5 MG/DL — SIGNIFICANT CHANGE UP (ref 3.4–8.8)
VISC BLD: 2.3 RATIO — HIGH (ref 1.4–1.8)

## 2017-05-24 DIAGNOSIS — C88.0 WALDENSTROM MACROGLOBULINEMIA: ICD-10-CM

## 2017-06-05 ENCOUNTER — RESULT REVIEW (OUTPATIENT)
Age: 57
End: 2017-06-05

## 2017-06-05 ENCOUNTER — APPOINTMENT (OUTPATIENT)
Dept: HEMATOLOGY ONCOLOGY | Facility: CLINIC | Age: 57
End: 2017-06-05

## 2017-06-05 VITALS
TEMPERATURE: 97.8 F | BODY MASS INDEX: 26.17 KG/M2 | WEIGHT: 186.95 LBS | OXYGEN SATURATION: 98 % | RESPIRATION RATE: 16 BRPM | DIASTOLIC BLOOD PRESSURE: 95 MMHG | SYSTOLIC BLOOD PRESSURE: 154 MMHG | HEART RATE: 100 BPM

## 2017-06-05 LAB
ALBUMIN SERPL ELPH-MCNC: 4.2 G/DL — SIGNIFICANT CHANGE UP (ref 3.3–5)
ALP SERPL-CCNC: 119 U/L — SIGNIFICANT CHANGE UP (ref 40–120)
ALT FLD-CCNC: 15 U/L — SIGNIFICANT CHANGE UP (ref 10–45)
ANION GAP SERPL CALC-SCNC: 17 MMOL/L — SIGNIFICANT CHANGE UP (ref 5–17)
AST SERPL-CCNC: 14 U/L — SIGNIFICANT CHANGE UP (ref 10–40)
BASOPHILS # BLD AUTO: 0.1 K/UL — SIGNIFICANT CHANGE UP (ref 0–0.2)
BASOPHILS NFR BLD AUTO: 1.4 % — SIGNIFICANT CHANGE UP (ref 0–2)
BILIRUB SERPL-MCNC: 0.5 MG/DL — SIGNIFICANT CHANGE UP (ref 0.2–1.2)
BUN SERPL-MCNC: 23 MG/DL — SIGNIFICANT CHANGE UP (ref 7–23)
CALCIUM SERPL-MCNC: 10.2 MG/DL — SIGNIFICANT CHANGE UP (ref 8.4–10.5)
CHLORIDE SERPL-SCNC: 100 MMOL/L — SIGNIFICANT CHANGE UP (ref 96–108)
CO2 SERPL-SCNC: 24 MMOL/L — SIGNIFICANT CHANGE UP (ref 22–31)
CREAT SERPL-MCNC: 1.26 MG/DL — SIGNIFICANT CHANGE UP (ref 0.5–1.3)
EOSINOPHIL # BLD AUTO: 0.1 K/UL — SIGNIFICANT CHANGE UP (ref 0–0.5)
EOSINOPHIL NFR BLD AUTO: 1.6 % — SIGNIFICANT CHANGE UP (ref 0–6)
GLUCOSE SERPL-MCNC: 154 MG/DL — HIGH (ref 70–99)
HCT VFR BLD CALC: 40 % — SIGNIFICANT CHANGE UP (ref 39–50)
HGB BLD-MCNC: 12.5 G/DL — LOW (ref 13–17)
LDH SERPL L TO P-CCNC: 96 U/L — SIGNIFICANT CHANGE UP (ref 50–242)
LYMPHOCYTES # BLD AUTO: 2.3 K/UL — SIGNIFICANT CHANGE UP (ref 1–3.3)
LYMPHOCYTES # BLD AUTO: 42.9 % — SIGNIFICANT CHANGE UP (ref 13–44)
MCHC RBC-ENTMCNC: 22.8 PG — LOW (ref 27–34)
MCHC RBC-ENTMCNC: 31.2 G/DL — LOW (ref 32–36)
MCV RBC AUTO: 73.1 FL — LOW (ref 80–100)
MONOCYTES # BLD AUTO: 0.7 K/UL — SIGNIFICANT CHANGE UP (ref 0–0.9)
MONOCYTES NFR BLD AUTO: 12.6 % — SIGNIFICANT CHANGE UP (ref 2–14)
NEUTROPHILS # BLD AUTO: 2.3 K/UL — SIGNIFICANT CHANGE UP (ref 1.8–7.4)
NEUTROPHILS NFR BLD AUTO: 41.6 % — LOW (ref 43–77)
PLATELET # BLD AUTO: 250 K/UL — SIGNIFICANT CHANGE UP (ref 150–400)
POTASSIUM SERPL-MCNC: 4.1 MMOL/L — SIGNIFICANT CHANGE UP (ref 3.5–5.3)
POTASSIUM SERPL-SCNC: 4.1 MMOL/L — SIGNIFICANT CHANGE UP (ref 3.5–5.3)
PROT SERPL-MCNC: 8.7 G/DL — HIGH (ref 6–8.3)
RBC # BLD: 5.47 M/UL — SIGNIFICANT CHANGE UP (ref 4.2–5.8)
RBC # FLD: 19.8 % — HIGH (ref 10.3–14.5)
SODIUM SERPL-SCNC: 141 MMOL/L — SIGNIFICANT CHANGE UP (ref 135–145)
URATE SERPL-MCNC: 6.6 MG/DL — SIGNIFICANT CHANGE UP (ref 3.4–8.8)
WBC # BLD: 5.4 K/UL — SIGNIFICANT CHANGE UP (ref 3.8–10.5)
WBC # FLD AUTO: 5.4 K/UL — SIGNIFICANT CHANGE UP (ref 3.8–10.5)

## 2017-06-05 RX ORDER — IBRUTINIB 140 MG/1
140 TABLET, FILM COATED ORAL DAILY
Qty: 90 | Refills: 5 | Status: ACTIVE | COMMUNITY
Start: 2017-05-08 | End: 1900-01-01

## 2017-06-06 LAB
IGA FLD-MCNC: 95 MG/DL — SIGNIFICANT CHANGE UP (ref 68–378)
IGG FLD-MCNC: 2120 MG/DL — HIGH (ref 694–1618)
IGM SERPL-MCNC: 1280 MG/DL — HIGH (ref 40–230)
KAPPA LC SER QL IFE: 80.2 MG/DL — HIGH (ref 0.33–1.94)
KAPPA/LAMBDA FREE LIGHT CHAIN RATIO, SERUM: 35.64 RATIO — HIGH (ref 0.26–1.65)
LAMBDA LC SER QL IFE: 2.25 MG/DL — SIGNIFICANT CHANGE UP (ref 0.57–2.63)

## 2017-06-08 ENCOUNTER — RESULT REVIEW (OUTPATIENT)
Age: 57
End: 2017-06-08

## 2017-06-15 RX ORDER — TAMSULOSIN HYDROCHLORIDE 0.4 MG/1
0.4 CAPSULE ORAL
Qty: 60 | Refills: 0 | Status: ACTIVE | COMMUNITY
Start: 2017-02-24 | End: 1900-01-01

## 2017-06-15 RX ORDER — OXYBUTYNIN CHLORIDE 5 MG/1
5 TABLET, EXTENDED RELEASE ORAL
Qty: 60 | Refills: 0 | Status: ACTIVE | COMMUNITY
Start: 2017-05-08 | End: 1900-01-01

## 2017-06-16 ENCOUNTER — OUTPATIENT (OUTPATIENT)
Dept: OUTPATIENT SERVICES | Facility: HOSPITAL | Age: 57
LOS: 1 days | Discharge: HOME | End: 2017-06-16

## 2017-06-16 DIAGNOSIS — J34.2 DEVIATED NASAL SEPTUM: Chronic | ICD-10-CM

## 2017-06-16 DIAGNOSIS — C88.0 WALDENSTROM MACROGLOBULINEMIA: ICD-10-CM

## 2017-06-22 ENCOUNTER — APPOINTMENT (OUTPATIENT)
Dept: HEMATOLOGY ONCOLOGY | Facility: CLINIC | Age: 57
End: 2017-06-22

## 2017-06-22 ENCOUNTER — RESULT REVIEW (OUTPATIENT)
Age: 57
End: 2017-06-22

## 2017-06-22 VITALS
DIASTOLIC BLOOD PRESSURE: 84 MMHG | TEMPERATURE: 97.9 F | OXYGEN SATURATION: 98 % | RESPIRATION RATE: 16 BRPM | HEART RATE: 88 BPM | SYSTOLIC BLOOD PRESSURE: 143 MMHG | WEIGHT: 185.63 LBS | BODY MASS INDEX: 25.99 KG/M2

## 2017-06-22 DIAGNOSIS — C88.0 WALDENSTROM MACROGLOBULINEMIA: ICD-10-CM

## 2017-06-22 RX ORDER — METFORMIN HYDROCHLORIDE 500 MG/1
500 TABLET, COATED ORAL
Refills: 0 | Status: ACTIVE | COMMUNITY
Start: 2017-06-22

## 2017-07-18 RX ORDER — MIRABEGRON 50 MG/1
50 TABLET, FILM COATED, EXTENDED RELEASE ORAL
Qty: 90 | Refills: 0 | Status: ACTIVE | OUTPATIENT
Start: 2017-04-14

## 2017-07-21 RX ORDER — FINASTERIDE 5 MG/1
5 TABLET, FILM COATED ORAL DAILY
Qty: 90 | Refills: 0 | Status: ACTIVE | OUTPATIENT
Start: 2017-02-24

## 2021-04-12 NOTE — ED ADULT TRIAGE NOTE - SPO2 (%)
Writer spoke with pt's mom to introduce self and answer any questions. Mom did not report any questions but shared positive feedback. Family session was set up for Friday, April 16 TH at 12:00.     [address]     [address]

## 2023-07-18 NOTE — PRE-OP CHECKLIST - TEMPERATURE IN CELSIUS (DEGREES C)
36.6 Adbry Counseling: I discussed with the patient the risks of tralokinumab including but not limited to eye infection and irritation, cold sores, injection site reactions, worsening of asthma, allergic reactions and increased risk of parasitic infection.  Live vaccines should be avoided while taking tralokinumab. The patient understands that monitoring is required and they must alert us or the primary physician if symptoms of infection or other concerning signs are noted.

## 2025-07-08 NOTE — H&P ADULT. - I WAS PHYSICALLY PRESENT FOR THE KEY PORTIONS OF THE EVALUATION AND MANAGEMENT (E/M) SERVICE PROVIDED.  I AGREE WITH THE ABOVE HISTORY, PHYSICAL, AND PLAN WHICH I HAVE REVIEWED AND EDITED WHERE APPROPRIATE
Use washcloth with soap and gently exfoliate the skin.  May apply a quick moisturizer or Aquaphor if desired.  The symptoms should continue to resolve on their own.      Contact the spa where you had the facial treatment completed and find out what ingredients were in your facial.       Statement Selected